# Patient Record
Sex: MALE | Race: WHITE | Employment: OTHER | URBAN - METROPOLITAN AREA
[De-identification: names, ages, dates, MRNs, and addresses within clinical notes are randomized per-mention and may not be internally consistent; named-entity substitution may affect disease eponyms.]

---

## 2017-10-31 PROBLEM — A41.9 SEPSIS (HCC): Status: ACTIVE | Noted: 2017-10-31

## 2017-10-31 PROBLEM — R31.9 HEMATURIA: Status: ACTIVE | Noted: 2017-10-31

## 2017-10-31 PROBLEM — R00.0 TACHYCARDIA: Status: ACTIVE | Noted: 2017-10-31

## 2017-10-31 PROBLEM — R10.9 ABDOMINAL PAIN: Status: ACTIVE | Noted: 2017-10-31

## 2017-10-31 PROBLEM — R50.9 FEVER: Status: ACTIVE | Noted: 2017-10-31

## 2017-10-31 PROBLEM — G93.40 ENCEPHALOPATHY: Status: ACTIVE | Noted: 2017-10-31

## 2017-10-31 PROBLEM — I10 HTN (HYPERTENSION): Status: ACTIVE | Noted: 2017-10-31

## 2017-10-31 PROBLEM — E11.9 DM (DIABETES MELLITUS) (HCC): Status: ACTIVE | Noted: 2017-10-31

## 2017-10-31 PROBLEM — H91.90 HOH (HARD OF HEARING): Status: ACTIVE | Noted: 2017-10-31

## 2017-10-31 PROBLEM — R09.02 HYPOXIA: Status: ACTIVE | Noted: 2017-10-31

## 2017-10-31 PROBLEM — I26.99 PULMONARY EMBOLISM (HCC): Status: ACTIVE | Noted: 2017-10-31

## 2017-10-31 PROBLEM — R06.02 SOB (SHORTNESS OF BREATH): Status: ACTIVE | Noted: 2017-10-31

## 2017-10-31 PROBLEM — N39.0 UTI (URINARY TRACT INFECTION): Status: ACTIVE | Noted: 2017-10-31

## 2017-10-31 PROBLEM — I95.9 HYPOTENSION: Status: ACTIVE | Noted: 2017-10-31

## 2017-11-01 PROBLEM — R10.30 LOWER ABDOMINAL PAIN: Status: ACTIVE | Noted: 2017-10-31

## 2017-11-03 PROBLEM — E44.0 MODERATE MALNUTRITION (HCC): Status: ACTIVE | Noted: 2017-11-03

## 2019-01-01 ENCOUNTER — TELEPHONE (OUTPATIENT)
Dept: CARDIOLOGY CLINIC | Age: 84
End: 2019-01-01

## 2019-01-01 ENCOUNTER — APPOINTMENT (OUTPATIENT)
Dept: INTERVENTIONAL RADIOLOGY/VASCULAR | Age: 84
DRG: 689 | End: 2019-01-01
Payer: MEDICARE

## 2019-01-01 ENCOUNTER — APPOINTMENT (OUTPATIENT)
Dept: GENERAL RADIOLOGY | Age: 84
DRG: 689 | End: 2019-01-01
Payer: MEDICARE

## 2019-01-01 ENCOUNTER — OFFICE VISIT (OUTPATIENT)
Dept: CARDIOLOGY CLINIC | Age: 84
End: 2019-01-01
Payer: MEDICARE

## 2019-01-01 ENCOUNTER — APPOINTMENT (OUTPATIENT)
Dept: GENERAL RADIOLOGY | Age: 84
DRG: 291 | End: 2019-01-01
Payer: MEDICARE

## 2019-01-01 ENCOUNTER — HOSPITAL ENCOUNTER (INPATIENT)
Age: 84
LOS: 4 days | Discharge: OTHER INSTITUTION | DRG: 291 | End: 2019-05-04
Attending: EMERGENCY MEDICINE | Admitting: INTERNAL MEDICINE
Payer: MEDICARE

## 2019-01-01 ENCOUNTER — APPOINTMENT (OUTPATIENT)
Dept: GENERAL RADIOLOGY | Age: 84
End: 2019-01-01
Payer: MEDICARE

## 2019-01-01 ENCOUNTER — APPOINTMENT (OUTPATIENT)
Dept: CT IMAGING | Age: 84
End: 2019-01-01
Payer: MEDICARE

## 2019-01-01 ENCOUNTER — HOSPITAL ENCOUNTER (OUTPATIENT)
Age: 84
Discharge: HOME | End: 2019-05-23
Payer: MEDICARE

## 2019-01-01 ENCOUNTER — HOSPITAL ENCOUNTER (INPATIENT)
Age: 84
LOS: 8 days | DRG: 689 | End: 2019-09-26
Attending: EMERGENCY MEDICINE | Admitting: EMERGENCY MEDICINE
Payer: MEDICARE

## 2019-01-01 ENCOUNTER — HOSPITAL ENCOUNTER (EMERGENCY)
Age: 84
Discharge: HOME | End: 2019-08-25
Attending: EMERGENCY MEDICINE
Payer: MEDICARE

## 2019-01-01 VITALS
HEART RATE: 73 BPM | HEIGHT: 70 IN | OXYGEN SATURATION: 95 % | SYSTOLIC BLOOD PRESSURE: 120 MMHG | DIASTOLIC BLOOD PRESSURE: 72 MMHG | WEIGHT: 157 LBS | BODY MASS INDEX: 22.48 KG/M2

## 2019-01-01 VITALS
HEART RATE: 62 BPM | BODY MASS INDEX: 21.58 KG/M2 | HEIGHT: 70 IN | RESPIRATION RATE: 16 BRPM | TEMPERATURE: 97.9 F | DIASTOLIC BLOOD PRESSURE: 74 MMHG | OXYGEN SATURATION: 92 % | SYSTOLIC BLOOD PRESSURE: 174 MMHG | WEIGHT: 150.7 LBS

## 2019-01-01 VITALS
DIASTOLIC BLOOD PRESSURE: 63 MMHG | RESPIRATION RATE: 26 BRPM | HEART RATE: 74 BPM | OXYGEN SATURATION: 94 % | WEIGHT: 146.2 LBS | TEMPERATURE: 97.6 F | SYSTOLIC BLOOD PRESSURE: 159 MMHG | BODY MASS INDEX: 20.93 KG/M2 | HEIGHT: 70 IN

## 2019-01-01 VITALS
HEIGHT: 70 IN | HEART RATE: 71 BPM | WEIGHT: 160 LBS | SYSTOLIC BLOOD PRESSURE: 193 MMHG | OXYGEN SATURATION: 93 % | DIASTOLIC BLOOD PRESSURE: 85 MMHG | RESPIRATION RATE: 18 BRPM | BODY MASS INDEX: 22.9 KG/M2 | TEMPERATURE: 97.8 F

## 2019-01-01 DIAGNOSIS — I10 ESSENTIAL HYPERTENSION: ICD-10-CM

## 2019-01-01 DIAGNOSIS — N30.00 ACUTE CYSTITIS WITHOUT HEMATURIA: Primary | ICD-10-CM

## 2019-01-01 DIAGNOSIS — I50.33 ACUTE ON CHRONIC DIASTOLIC (CONGESTIVE) HEART FAILURE (HCC): ICD-10-CM

## 2019-01-01 DIAGNOSIS — R06.02 SOB (SHORTNESS OF BREATH): ICD-10-CM

## 2019-01-01 DIAGNOSIS — E86.0 DEHYDRATION: ICD-10-CM

## 2019-01-01 DIAGNOSIS — D72.829 LEUKOCYTOSIS, UNSPECIFIED TYPE: ICD-10-CM

## 2019-01-01 DIAGNOSIS — N39.0 URINARY TRACT INFECTION WITHOUT HEMATURIA, SITE UNSPECIFIED: ICD-10-CM

## 2019-01-01 DIAGNOSIS — I50.33 ACUTE ON CHRONIC DIASTOLIC (CONGESTIVE) HEART FAILURE (HCC): Primary | ICD-10-CM

## 2019-01-01 DIAGNOSIS — I35.0 NONRHEUMATIC AORTIC VALVE STENOSIS: ICD-10-CM

## 2019-01-01 DIAGNOSIS — R41.0 DELIRIUM: ICD-10-CM

## 2019-01-01 DIAGNOSIS — I50.9 NEW ONSET OF CONGESTIVE HEART FAILURE (HCC): Primary | ICD-10-CM

## 2019-01-01 DIAGNOSIS — R42 DIZZINESS: Primary | ICD-10-CM

## 2019-01-01 DIAGNOSIS — R06.02 SHORTNESS OF BREATH: ICD-10-CM

## 2019-01-01 LAB
A/G RATIO: 1.3 (ref 1.1–2.2)
A/G RATIO: 1.4 (ref 1.1–2.2)
A/G RATIO: 1.4 (ref 1.1–2.2)
ALBUMIN SERPL-MCNC: 3.5 G/DL (ref 3.4–5)
ALBUMIN SERPL-MCNC: 3.7 G/DL (ref 3.4–5)
ALBUMIN SERPL-MCNC: 4.5 G/DL (ref 3.4–5)
ALP BLD-CCNC: 101 U/L (ref 40–129)
ALP BLD-CCNC: 111 U/L (ref 40–129)
ALP BLD-CCNC: 75 U/L (ref 40–129)
ALT SERPL-CCNC: 10 U/L (ref 10–40)
ALT SERPL-CCNC: 7 U/L (ref 10–40)
ALT SERPL-CCNC: 9 U/L (ref 10–40)
ANION GAP SERPL CALCULATED.3IONS-SCNC: 10 MMOL/L (ref 3–16)
ANION GAP SERPL CALCULATED.3IONS-SCNC: 12 MMOL/L (ref 3–16)
ANION GAP SERPL CALCULATED.3IONS-SCNC: 12 MMOL/L (ref 3–16)
ANION GAP SERPL CALCULATED.3IONS-SCNC: 13 MMOL/L (ref 3–16)
ANION GAP SERPL CALCULATED.3IONS-SCNC: 8 MMOL/L (ref 3–16)
ANION GAP SERPL CALCULATED.3IONS-SCNC: 9 MMOL/L (ref 3–16)
ANION GAP SERPL CALCULATED.3IONS-SCNC: 9 MMOL/L (ref 3–16)
ANISOCYTOSIS: ABNORMAL
AST SERPL-CCNC: 13 U/L (ref 15–37)
AST SERPL-CCNC: 14 U/L (ref 15–37)
AST SERPL-CCNC: 19 U/L (ref 15–37)
ATYPICAL LYMPHOCYTE RELATIVE PERCENT: 1 % (ref 0–6)
ATYPICAL LYMPHOCYTE RELATIVE PERCENT: 3 % (ref 0–6)
BACTERIA: ABNORMAL /HPF
BANDED NEUTROPHILS RELATIVE PERCENT: 16 % (ref 0–7)
BANDED NEUTROPHILS RELATIVE PERCENT: 17 % (ref 0–7)
BANDED NEUTROPHILS RELATIVE PERCENT: 32 % (ref 0–7)
BASE EXCESS ARTERIAL: -0.5 MMOL/L (ref -3–3)
BASE EXCESS ARTERIAL: 0.5 MMOL/L (ref -3–3)
BASE EXCESS ARTERIAL: 1.7 MMOL/L (ref -3–3)
BASE EXCESS ARTERIAL: 6.2 MMOL/L (ref -3–3)
BASE EXCESS VENOUS: 0.7 MMOL/L (ref -3–3)
BASOPHILS ABSOLUTE: 0 K/UL (ref 0–0.2)
BASOPHILS ABSOLUTE: 0.1 K/UL (ref 0–0.2)
BASOPHILS ABSOLUTE: 0.2 K/UL (ref 0–0.2)
BASOPHILS ABSOLUTE: 0.4 K/UL (ref 0–0.2)
BASOPHILS RELATIVE PERCENT: 0 %
BASOPHILS RELATIVE PERCENT: 0.4 %
BASOPHILS RELATIVE PERCENT: 0.4 %
BASOPHILS RELATIVE PERCENT: 0.5 %
BASOPHILS RELATIVE PERCENT: 0.6 %
BASOPHILS RELATIVE PERCENT: 0.7 %
BASOPHILS RELATIVE PERCENT: 0.8 %
BASOPHILS RELATIVE PERCENT: 1 %
BASOPHILS RELATIVE PERCENT: 1 %
BASOPHILS RELATIVE PERCENT: 1.3 %
BASOPHILS RELATIVE PERCENT: 2 %
BILIRUB SERPL-MCNC: 0.4 MG/DL (ref 0–1)
BILIRUB SERPL-MCNC: 0.6 MG/DL (ref 0–1)
BILIRUB SERPL-MCNC: 0.7 MG/DL (ref 0–1)
BILIRUBIN URINE: NEGATIVE
BILIRUBIN URINE: NEGATIVE
BLOOD, URINE: ABNORMAL
BLOOD, URINE: ABNORMAL
BUN BLDV-MCNC: 10 MG/DL (ref 7–20)
BUN BLDV-MCNC: 13 MG/DL (ref 7–20)
BUN BLDV-MCNC: 13 MG/DL (ref 7–20)
BUN BLDV-MCNC: 15 MG/DL (ref 7–20)
BUN BLDV-MCNC: 16 MG/DL (ref 7–20)
BUN BLDV-MCNC: 18 MG/DL (ref 7–20)
BUN BLDV-MCNC: 19 MG/DL (ref 7–20)
BUN BLDV-MCNC: 19 MG/DL (ref 7–20)
BUN BLDV-MCNC: 20 MG/DL (ref 7–20)
BUN BLDV-MCNC: 25 MG/DL (ref 7–20)
BUN BLDV-MCNC: 26 MG/DL (ref 7–20)
CALCIUM SERPL-MCNC: 10.3 MG/DL (ref 8.3–10.6)
CALCIUM SERPL-MCNC: 10.9 MG/DL (ref 8.3–10.6)
CALCIUM SERPL-MCNC: 8.6 MG/DL (ref 8.3–10.6)
CALCIUM SERPL-MCNC: 8.6 MG/DL (ref 8.3–10.6)
CALCIUM SERPL-MCNC: 8.7 MG/DL (ref 8.3–10.6)
CALCIUM SERPL-MCNC: 8.8 MG/DL (ref 8.3–10.6)
CALCIUM SERPL-MCNC: 8.8 MG/DL (ref 8.3–10.6)
CALCIUM SERPL-MCNC: 8.9 MG/DL (ref 8.3–10.6)
CALCIUM SERPL-MCNC: 9.2 MG/DL (ref 8.3–10.6)
CALCIUM SERPL-MCNC: 9.3 MG/DL (ref 8.3–10.6)
CALCIUM SERPL-MCNC: 9.7 MG/DL (ref 8.3–10.6)
CARBOXYHEMOGLOBIN ARTERIAL: 0.7 % (ref 0–1.5)
CARBOXYHEMOGLOBIN ARTERIAL: 0.8 % (ref 0–1.5)
CARBOXYHEMOGLOBIN ARTERIAL: 0.9 % (ref 0–1.5)
CARBOXYHEMOGLOBIN ARTERIAL: 1.1 % (ref 0–1.5)
CARBOXYHEMOGLOBIN: 3.1 % (ref 0–1.5)
CHLORIDE BLD-SCNC: 101 MMOL/L (ref 99–110)
CHLORIDE BLD-SCNC: 102 MMOL/L (ref 99–110)
CHLORIDE BLD-SCNC: 94 MMOL/L (ref 99–110)
CHLORIDE BLD-SCNC: 95 MMOL/L (ref 99–110)
CHLORIDE BLD-SCNC: 95 MMOL/L (ref 99–110)
CHLORIDE BLD-SCNC: 96 MMOL/L (ref 99–110)
CHLORIDE BLD-SCNC: 97 MMOL/L (ref 99–110)
CHLORIDE BLD-SCNC: 97 MMOL/L (ref 99–110)
CHLORIDE BLD-SCNC: 98 MMOL/L (ref 99–110)
CHLORIDE BLD-SCNC: 98 MMOL/L (ref 99–110)
CHLORIDE BLD-SCNC: 99 MMOL/L (ref 99–110)
CHOLESTEROL, TOTAL: 101 MG/DL (ref 0–199)
CLARITY: ABNORMAL
CLARITY: ABNORMAL
CO2: 24 MMOL/L (ref 21–32)
CO2: 28 MMOL/L (ref 21–32)
CO2: 28 MMOL/L (ref 21–32)
CO2: 30 MMOL/L (ref 21–32)
CO2: 32 MMOL/L (ref 21–32)
CO2: 32 MMOL/L (ref 21–32)
CO2: 33 MMOL/L (ref 21–32)
CO2: 35 MMOL/L (ref 21–32)
CO2: 35 MMOL/L (ref 21–32)
COLOR: YELLOW
COLOR: YELLOW
CREAT SERPL-MCNC: 0.6 MG/DL (ref 0.8–1.3)
CREAT SERPL-MCNC: 0.7 MG/DL (ref 0.8–1.3)
CREAT SERPL-MCNC: 0.8 MG/DL (ref 0.8–1.3)
CREAT SERPL-MCNC: 1.1 MG/DL (ref 0.8–1.3)
CREAT SERPL-MCNC: <0.5 MG/DL (ref 0.8–1.3)
CULTURE, RESPIRATORY: NORMAL
EKG ATRIAL RATE: 72 BPM
EKG ATRIAL RATE: 73 BPM
EKG ATRIAL RATE: 85 BPM
EKG DIAGNOSIS: NORMAL
EKG P AXIS: 55 DEGREES
EKG P AXIS: 60 DEGREES
EKG P AXIS: 67 DEGREES
EKG P-R INTERVAL: 172 MS
EKG P-R INTERVAL: 190 MS
EKG P-R INTERVAL: 202 MS
EKG Q-T INTERVAL: 368 MS
EKG Q-T INTERVAL: 394 MS
EKG Q-T INTERVAL: 404 MS
EKG QRS DURATION: 112 MS
EKG QRS DURATION: 94 MS
EKG QRS DURATION: 98 MS
EKG QTC CALCULATION (BAZETT): 431 MS
EKG QTC CALCULATION (BAZETT): 437 MS
EKG QTC CALCULATION (BAZETT): 445 MS
EKG R AXIS: -50 DEGREES
EKG R AXIS: -53 DEGREES
EKG R AXIS: -62 DEGREES
EKG T AXIS: 64 DEGREES
EKG T AXIS: 72 DEGREES
EKG T AXIS: 75 DEGREES
EKG VENTRICULAR RATE: 72 BPM
EKG VENTRICULAR RATE: 73 BPM
EKG VENTRICULAR RATE: 85 BPM
EOSINOPHILS ABSOLUTE: 0 K/UL (ref 0–0.6)
EOSINOPHILS ABSOLUTE: 0.2 K/UL (ref 0–0.6)
EOSINOPHILS ABSOLUTE: 0.2 K/UL (ref 0–0.6)
EOSINOPHILS ABSOLUTE: 0.3 K/UL (ref 0–0.6)
EOSINOPHILS ABSOLUTE: 0.4 K/UL (ref 0–0.6)
EOSINOPHILS ABSOLUTE: 0.5 K/UL (ref 0–0.6)
EOSINOPHILS ABSOLUTE: 0.7 K/UL (ref 0–0.6)
EOSINOPHILS RELATIVE PERCENT: 0 %
EOSINOPHILS RELATIVE PERCENT: 1 %
EOSINOPHILS RELATIVE PERCENT: 1.6 %
EOSINOPHILS RELATIVE PERCENT: 1.8 %
EOSINOPHILS RELATIVE PERCENT: 1.8 %
EOSINOPHILS RELATIVE PERCENT: 2 %
EOSINOPHILS RELATIVE PERCENT: 2.1 %
EOSINOPHILS RELATIVE PERCENT: 2.4 %
EOSINOPHILS RELATIVE PERCENT: 2.4 %
EOSINOPHILS RELATIVE PERCENT: 2.7 %
EOSINOPHILS RELATIVE PERCENT: 4.6 %
EPITHELIAL CELLS, UA: ABNORMAL /HPF
EPITHELIAL CELLS, UA: ABNORMAL /HPF
GFR AFRICAN AMERICAN: >60
GFR NON-AFRICAN AMERICAN: >60
GLOBULIN: 2.6 G/DL
GLOBULIN: 2.6 G/DL
GLOBULIN: 3.2 G/DL
GLUCOSE BLD-MCNC: 115 MG/DL (ref 70–99)
GLUCOSE BLD-MCNC: 118 MG/DL (ref 70–99)
GLUCOSE BLD-MCNC: 118 MG/DL (ref 70–99)
GLUCOSE BLD-MCNC: 121 MG/DL (ref 70–99)
GLUCOSE BLD-MCNC: 124 MG/DL (ref 70–99)
GLUCOSE BLD-MCNC: 124 MG/DL (ref 70–99)
GLUCOSE BLD-MCNC: 126 MG/DL (ref 70–99)
GLUCOSE BLD-MCNC: 128 MG/DL (ref 70–99)
GLUCOSE BLD-MCNC: 128 MG/DL (ref 70–99)
GLUCOSE BLD-MCNC: 131 MG/DL (ref 70–99)
GLUCOSE BLD-MCNC: 135 MG/DL (ref 70–99)
GLUCOSE BLD-MCNC: 137 MG/DL (ref 70–99)
GLUCOSE BLD-MCNC: 138 MG/DL (ref 70–99)
GLUCOSE BLD-MCNC: 142 MG/DL (ref 70–99)
GLUCOSE BLD-MCNC: 142 MG/DL (ref 70–99)
GLUCOSE BLD-MCNC: 144 MG/DL (ref 70–99)
GLUCOSE BLD-MCNC: 144 MG/DL (ref 70–99)
GLUCOSE BLD-MCNC: 145 MG/DL (ref 70–99)
GLUCOSE BLD-MCNC: 146 MG/DL (ref 70–99)
GLUCOSE BLD-MCNC: 149 MG/DL (ref 70–99)
GLUCOSE BLD-MCNC: 152 MG/DL (ref 70–99)
GLUCOSE BLD-MCNC: 153 MG/DL (ref 70–99)
GLUCOSE BLD-MCNC: 154 MG/DL (ref 70–99)
GLUCOSE BLD-MCNC: 155 MG/DL (ref 70–99)
GLUCOSE BLD-MCNC: 155 MG/DL (ref 70–99)
GLUCOSE BLD-MCNC: 156 MG/DL (ref 70–99)
GLUCOSE BLD-MCNC: 157 MG/DL (ref 70–99)
GLUCOSE BLD-MCNC: 158 MG/DL (ref 70–99)
GLUCOSE BLD-MCNC: 159 MG/DL (ref 70–99)
GLUCOSE BLD-MCNC: 160 MG/DL (ref 70–99)
GLUCOSE BLD-MCNC: 160 MG/DL (ref 70–99)
GLUCOSE BLD-MCNC: 165 MG/DL (ref 70–99)
GLUCOSE BLD-MCNC: 166 MG/DL (ref 70–99)
GLUCOSE BLD-MCNC: 166 MG/DL (ref 70–99)
GLUCOSE BLD-MCNC: 168 MG/DL (ref 70–99)
GLUCOSE BLD-MCNC: 168 MG/DL (ref 70–99)
GLUCOSE BLD-MCNC: 170 MG/DL (ref 70–99)
GLUCOSE BLD-MCNC: 170 MG/DL (ref 70–99)
GLUCOSE BLD-MCNC: 171 MG/DL (ref 70–99)
GLUCOSE BLD-MCNC: 173 MG/DL (ref 70–99)
GLUCOSE BLD-MCNC: 179 MG/DL (ref 70–99)
GLUCOSE BLD-MCNC: 180 MG/DL (ref 70–99)
GLUCOSE BLD-MCNC: 182 MG/DL (ref 70–99)
GLUCOSE BLD-MCNC: 182 MG/DL (ref 70–99)
GLUCOSE BLD-MCNC: 184 MG/DL (ref 70–99)
GLUCOSE BLD-MCNC: 187 MG/DL (ref 70–99)
GLUCOSE BLD-MCNC: 187 MG/DL (ref 70–99)
GLUCOSE BLD-MCNC: 191 MG/DL (ref 70–99)
GLUCOSE BLD-MCNC: 191 MG/DL (ref 70–99)
GLUCOSE BLD-MCNC: 196 MG/DL (ref 70–99)
GLUCOSE BLD-MCNC: 208 MG/DL (ref 70–99)
GLUCOSE BLD-MCNC: 210 MG/DL (ref 70–99)
GLUCOSE BLD-MCNC: 215 MG/DL (ref 70–99)
GLUCOSE BLD-MCNC: 258 MG/DL (ref 70–99)
GLUCOSE BLD-MCNC: 63 MG/DL (ref 70–99)
GLUCOSE URINE: NEGATIVE MG/DL
GLUCOSE URINE: NEGATIVE MG/DL
GRAM STAIN RESULT: NORMAL
HCO3 ARTERIAL: 26.8 MMOL/L (ref 21–29)
HCO3 ARTERIAL: 28.5 MMOL/L (ref 21–29)
HCO3 ARTERIAL: 29.5 MMOL/L (ref 21–29)
HCO3 ARTERIAL: 33.7 MMOL/L (ref 21–29)
HCO3 VENOUS: 26.4 MMOL/L (ref 23–29)
HCT VFR BLD CALC: 50 % (ref 40.5–52.5)
HCT VFR BLD CALC: 50.5 % (ref 40.5–52.5)
HCT VFR BLD CALC: 51.1 % (ref 40.5–52.5)
HCT VFR BLD CALC: 51.3 % (ref 40.5–52.5)
HCT VFR BLD CALC: 51.5 % (ref 40.5–52.5)
HCT VFR BLD CALC: 52 % (ref 40.5–52.5)
HCT VFR BLD CALC: 52.1 % (ref 40.5–52.5)
HCT VFR BLD CALC: 52.5 % (ref 40.5–52.5)
HCT VFR BLD CALC: 52.8 % (ref 40.5–52.5)
HCT VFR BLD CALC: 52.8 % (ref 40.5–52.5)
HCT VFR BLD CALC: 55.5 % (ref 40.5–52.5)
HDLC SERPL-MCNC: 42 MG/DL (ref 40–60)
HEMOGLOBIN, ART, EXTENDED: 17.1 G/DL (ref 13.5–17.5)
HEMOGLOBIN, ART, EXTENDED: 17.5 G/DL (ref 13.5–17.5)
HEMOGLOBIN, ART, EXTENDED: 17.7 G/DL (ref 13.5–17.5)
HEMOGLOBIN, ART, EXTENDED: 17.9 G/DL (ref 13.5–17.5)
HEMOGLOBIN: 16 G/DL (ref 13.5–17.5)
HEMOGLOBIN: 16.3 G/DL (ref 13.5–17.5)
HEMOGLOBIN: 16.4 G/DL (ref 13.5–17.5)
HEMOGLOBIN: 16.6 G/DL (ref 13.5–17.5)
HEMOGLOBIN: 16.7 G/DL (ref 13.5–17.5)
HEMOGLOBIN: 16.8 G/DL (ref 13.5–17.5)
HEMOGLOBIN: 17 G/DL (ref 13.5–17.5)
HEMOGLOBIN: 18.1 G/DL (ref 13.5–17.5)
KETONES, URINE: NEGATIVE MG/DL
KETONES, URINE: NEGATIVE MG/DL
LACTIC ACID: 0.9 MMOL/L (ref 0.4–2)
LACTIC ACID: 1 MMOL/L (ref 0.4–2)
LDL CHOLESTEROL CALCULATED: 40 MG/DL
LEUKOCYTE ESTERASE, URINE: ABNORMAL
LEUKOCYTE ESTERASE, URINE: ABNORMAL
LIPASE: 24 U/L (ref 13–60)
LV EF: 55 %
LVEF MODALITY: NORMAL
LYMPHOCYTES ABSOLUTE: 1 K/UL (ref 1–5.1)
LYMPHOCYTES ABSOLUTE: 1.1 K/UL (ref 1–5.1)
LYMPHOCYTES ABSOLUTE: 1.2 K/UL (ref 1–5.1)
LYMPHOCYTES ABSOLUTE: 1.3 K/UL (ref 1–5.1)
LYMPHOCYTES ABSOLUTE: 1.4 K/UL (ref 1–5.1)
LYMPHOCYTES ABSOLUTE: 1.6 K/UL (ref 1–5.1)
LYMPHOCYTES ABSOLUTE: 1.7 K/UL (ref 1–5.1)
LYMPHOCYTES ABSOLUTE: 1.9 K/UL (ref 1–5.1)
LYMPHOCYTES RELATIVE PERCENT: 11.2 %
LYMPHOCYTES RELATIVE PERCENT: 4 %
LYMPHOCYTES RELATIVE PERCENT: 5.8 %
LYMPHOCYTES RELATIVE PERCENT: 6.5 %
LYMPHOCYTES RELATIVE PERCENT: 7 %
LYMPHOCYTES RELATIVE PERCENT: 7 %
LYMPHOCYTES RELATIVE PERCENT: 7.1 %
LYMPHOCYTES RELATIVE PERCENT: 8 %
LYMPHOCYTES RELATIVE PERCENT: 8.3 %
LYMPHOCYTES RELATIVE PERCENT: 8.7 %
LYMPHOCYTES RELATIVE PERCENT: 9.4 %
MAGNESIUM: 1.5 MG/DL (ref 1.8–2.4)
MAGNESIUM: 1.6 MG/DL (ref 1.8–2.4)
MAGNESIUM: 1.7 MG/DL (ref 1.8–2.4)
MAGNESIUM: 1.9 MG/DL (ref 1.8–2.4)
MAGNESIUM: 2 MG/DL (ref 1.8–2.4)
MCH RBC QN AUTO: 25.1 PG (ref 26–34)
MCH RBC QN AUTO: 26.8 PG (ref 26–34)
MCH RBC QN AUTO: 26.9 PG (ref 26–34)
MCH RBC QN AUTO: 27 PG (ref 26–34)
MCH RBC QN AUTO: 27.1 PG (ref 26–34)
MCH RBC QN AUTO: 27.2 PG (ref 26–34)
MCH RBC QN AUTO: 27.2 PG (ref 26–34)
MCH RBC QN AUTO: 27.3 PG (ref 26–34)
MCHC RBC AUTO-ENTMCNC: 31.7 G/DL (ref 31–36)
MCHC RBC AUTO-ENTMCNC: 31.7 G/DL (ref 31–36)
MCHC RBC AUTO-ENTMCNC: 31.8 G/DL (ref 31–36)
MCHC RBC AUTO-ENTMCNC: 32 G/DL (ref 31–36)
MCHC RBC AUTO-ENTMCNC: 32.1 G/DL (ref 31–36)
MCHC RBC AUTO-ENTMCNC: 32.2 G/DL (ref 31–36)
MCHC RBC AUTO-ENTMCNC: 32.3 G/DL (ref 31–36)
MCHC RBC AUTO-ENTMCNC: 32.4 G/DL (ref 31–36)
MCHC RBC AUTO-ENTMCNC: 32.4 G/DL (ref 31–36)
MCHC RBC AUTO-ENTMCNC: 32.6 G/DL (ref 31–36)
MCHC RBC AUTO-ENTMCNC: 32.7 G/DL (ref 31–36)
MCV RBC AUTO: 78.3 FL (ref 80–100)
MCV RBC AUTO: 83.3 FL (ref 80–100)
MCV RBC AUTO: 83.5 FL (ref 80–100)
MCV RBC AUTO: 83.5 FL (ref 80–100)
MCV RBC AUTO: 83.7 FL (ref 80–100)
MCV RBC AUTO: 83.7 FL (ref 80–100)
MCV RBC AUTO: 83.8 FL (ref 80–100)
MCV RBC AUTO: 84.2 FL (ref 80–100)
MCV RBC AUTO: 84.8 FL (ref 80–100)
MCV RBC AUTO: 85.2 FL (ref 80–100)
MCV RBC AUTO: 85.2 FL (ref 80–100)
METHEMOGLOBIN ARTERIAL: 0.6 %
METHEMOGLOBIN ARTERIAL: 0.7 %
METHEMOGLOBIN ARTERIAL: 0.8 %
METHEMOGLOBIN ARTERIAL: 0.8 %
METHEMOGLOBIN VENOUS: 0.7 %
MICROSCOPIC EXAMINATION: YES
MICROSCOPIC EXAMINATION: YES
MONOCYTES ABSOLUTE: 0.2 K/UL (ref 0–1.3)
MONOCYTES ABSOLUTE: 0.3 K/UL (ref 0–1.3)
MONOCYTES ABSOLUTE: 0.5 K/UL (ref 0–1.3)
MONOCYTES ABSOLUTE: 0.6 K/UL (ref 0–1.3)
MONOCYTES ABSOLUTE: 0.7 K/UL (ref 0–1.3)
MONOCYTES RELATIVE PERCENT: 1 %
MONOCYTES RELATIVE PERCENT: 2 %
MONOCYTES RELATIVE PERCENT: 3 %
MONOCYTES RELATIVE PERCENT: 3.1 %
MONOCYTES RELATIVE PERCENT: 3.2 %
MONOCYTES RELATIVE PERCENT: 3.2 %
MONOCYTES RELATIVE PERCENT: 3.3 %
MONOCYTES RELATIVE PERCENT: 3.4 %
MONOCYTES RELATIVE PERCENT: 3.4 %
MONOCYTES RELATIVE PERCENT: 3.6 %
MONOCYTES RELATIVE PERCENT: 4 %
MYELOCYTE PERCENT: 1 %
NEUTROPHILS ABSOLUTE: 11.6 K/UL (ref 1.7–7.7)
NEUTROPHILS ABSOLUTE: 13.4 K/UL (ref 1.7–7.7)
NEUTROPHILS ABSOLUTE: 13.4 K/UL (ref 1.7–7.7)
NEUTROPHILS ABSOLUTE: 13.8 K/UL (ref 1.7–7.7)
NEUTROPHILS ABSOLUTE: 14 K/UL (ref 1.7–7.7)
NEUTROPHILS ABSOLUTE: 14 K/UL (ref 1.7–7.7)
NEUTROPHILS ABSOLUTE: 16.7 K/UL (ref 1.7–7.7)
NEUTROPHILS ABSOLUTE: 17.7 K/UL (ref 1.7–7.7)
NEUTROPHILS ABSOLUTE: 17.9 K/UL (ref 1.7–7.7)
NEUTROPHILS ABSOLUTE: 18.1 K/UL (ref 1.7–7.7)
NEUTROPHILS ABSOLUTE: 9.4 K/UL (ref 1.7–7.7)
NEUTROPHILS RELATIVE PERCENT: 59 %
NEUTROPHILS RELATIVE PERCENT: 68 %
NEUTROPHILS RELATIVE PERCENT: 72 %
NEUTROPHILS RELATIVE PERCENT: 79.5 %
NEUTROPHILS RELATIVE PERCENT: 84.2 %
NEUTROPHILS RELATIVE PERCENT: 84.9 %
NEUTROPHILS RELATIVE PERCENT: 85.2 %
NEUTROPHILS RELATIVE PERCENT: 86.3 %
NEUTROPHILS RELATIVE PERCENT: 87.1 %
NEUTROPHILS RELATIVE PERCENT: 87.7 %
NEUTROPHILS RELATIVE PERCENT: 88.8 %
NITRITE, URINE: NEGATIVE
NITRITE, URINE: NEGATIVE
O2 CONTENT ARTERIAL: 22 ML/DL
O2 CONTENT ARTERIAL: 23 ML/DL
O2 CONTENT ARTERIAL: 23 ML/DL
O2 CONTENT ARTERIAL: 24 ML/DL
O2 CONTENT, VEN: 22 VOL %
O2 SAT, ARTERIAL: 92.7 %
O2 SAT, ARTERIAL: 93 %
O2 SAT, ARTERIAL: 96.1 %
O2 SAT, ARTERIAL: 96.5 %
O2 SAT, VEN: 96 %
O2 THERAPY: ABNORMAL
PCO2 ARTERIAL: 51.9 MMHG (ref 35–45)
PCO2 ARTERIAL: 53.2 MMHG (ref 35–45)
PCO2 ARTERIAL: 58.3 MMHG (ref 35–45)
PCO2 ARTERIAL: 64.6 MMHG (ref 35–45)
PCO2, VEN: 45.9 MMHG (ref 40–50)
PDW BLD-RTO: 16.2 % (ref 12.4–15.4)
PDW BLD-RTO: 16.4 % (ref 12.4–15.4)
PDW BLD-RTO: 16.4 % (ref 12.4–15.4)
PDW BLD-RTO: 16.5 % (ref 12.4–15.4)
PDW BLD-RTO: 16.6 % (ref 12.4–15.4)
PDW BLD-RTO: 16.7 % (ref 12.4–15.4)
PDW BLD-RTO: 16.8 % (ref 12.4–15.4)
PDW BLD-RTO: 17.6 % (ref 12.4–15.4)
PDW BLD-RTO: 18.1 % (ref 12.4–15.4)
PERFORMED ON: ABNORMAL
PH ARTERIAL: 7.28 (ref 7.35–7.45)
PH ARTERIAL: 7.32 (ref 7.35–7.45)
PH ARTERIAL: 7.36 (ref 7.35–7.45)
PH ARTERIAL: 7.38 (ref 7.35–7.45)
PH UA: 6 (ref 5–8)
PH UA: 6.5 (ref 5–8)
PH VENOUS: 7.38 (ref 7.35–7.45)
PLATELET # BLD: 184 K/UL (ref 135–450)
PLATELET # BLD: 212 K/UL (ref 135–450)
PLATELET # BLD: 226 K/UL (ref 135–450)
PLATELET # BLD: 235 K/UL (ref 135–450)
PLATELET # BLD: 246 K/UL (ref 135–450)
PLATELET # BLD: 248 K/UL (ref 135–450)
PLATELET # BLD: 253 K/UL (ref 135–450)
PLATELET # BLD: 256 K/UL (ref 135–450)
PLATELET # BLD: 258 K/UL (ref 135–450)
PLATELET # BLD: 269 K/UL (ref 135–450)
PLATELET # BLD: 291 K/UL (ref 135–450)
PLATELET SLIDE REVIEW: ADEQUATE
PLATELET SLIDE REVIEW: ADEQUATE
PMV BLD AUTO: 7.4 FL (ref 5–10.5)
PMV BLD AUTO: 7.6 FL (ref 5–10.5)
PMV BLD AUTO: 7.7 FL (ref 5–10.5)
PMV BLD AUTO: 7.7 FL (ref 5–10.5)
PMV BLD AUTO: 7.8 FL (ref 5–10.5)
PMV BLD AUTO: 7.9 FL (ref 5–10.5)
PMV BLD AUTO: 8 FL (ref 5–10.5)
PMV BLD AUTO: 8.1 FL (ref 5–10.5)
PO2 ARTERIAL: 62.6 MMHG (ref 75–108)
PO2 ARTERIAL: 67 MMHG (ref 75–108)
PO2 ARTERIAL: 80.6 MMHG (ref 75–108)
PO2 ARTERIAL: 84.7 MMHG (ref 75–108)
PO2, VEN: 81.7 MMHG (ref 25–40)
POTASSIUM REFLEX MAGNESIUM: 3.6 MMOL/L (ref 3.5–5.1)
POTASSIUM REFLEX MAGNESIUM: 4.1 MMOL/L (ref 3.5–5.1)
POTASSIUM REFLEX MAGNESIUM: 4.3 MMOL/L (ref 3.5–5.1)
POTASSIUM SERPL-SCNC: 3.4 MMOL/L (ref 3.5–5.1)
POTASSIUM SERPL-SCNC: 3.4 MMOL/L (ref 3.5–5.1)
POTASSIUM SERPL-SCNC: 3.7 MMOL/L (ref 3.5–5.1)
POTASSIUM SERPL-SCNC: 3.9 MMOL/L (ref 3.5–5.1)
POTASSIUM SERPL-SCNC: 4.1 MMOL/L (ref 3.5–5.1)
POTASSIUM SERPL-SCNC: 4.4 MMOL/L (ref 3.5–5.1)
POTASSIUM SERPL-SCNC: 5.3 MMOL/L (ref 3.5–5.1)
POTASSIUM SERPL-SCNC: 5.4 MMOL/L (ref 3.5–5.1)
PRO-BNP: 1500 PG/ML (ref 0–449)
PRO-BNP: 1554 PG/ML (ref 0–449)
PRO-BNP: 3026 PG/ML (ref 0–449)
PRO-BNP: 661 PG/ML (ref 0–449)
PROCALCITONIN: 0.06 NG/ML (ref 0–0.15)
PROTEIN UA: 30 MG/DL
PROTEIN UA: NEGATIVE MG/DL
RBC # BLD: 6.07 M/UL (ref 4.2–5.9)
RBC # BLD: 6.1 M/UL (ref 4.2–5.9)
RBC # BLD: 6.14 M/UL (ref 4.2–5.9)
RBC # BLD: 6.14 M/UL (ref 4.2–5.9)
RBC # BLD: 6.15 M/UL (ref 4.2–5.9)
RBC # BLD: 6.17 M/UL (ref 4.2–5.9)
RBC # BLD: 6.2 M/UL (ref 4.2–5.9)
RBC # BLD: 6.2 M/UL (ref 4.2–5.9)
RBC # BLD: 6.28 M/UL (ref 4.2–5.9)
RBC # BLD: 6.39 M/UL (ref 4.2–5.9)
RBC # BLD: 6.65 M/UL (ref 4.2–5.9)
RBC UA: ABNORMAL /HPF (ref 0–2)
RBC UA: ABNORMAL /HPF (ref 0–2)
SLIDE REVIEW: ABNORMAL
SODIUM BLD-SCNC: 134 MMOL/L (ref 136–145)
SODIUM BLD-SCNC: 137 MMOL/L (ref 136–145)
SODIUM BLD-SCNC: 137 MMOL/L (ref 136–145)
SODIUM BLD-SCNC: 138 MMOL/L (ref 136–145)
SODIUM BLD-SCNC: 139 MMOL/L (ref 136–145)
SODIUM BLD-SCNC: 139 MMOL/L (ref 136–145)
SODIUM BLD-SCNC: 140 MMOL/L (ref 136–145)
SODIUM BLD-SCNC: 141 MMOL/L (ref 136–145)
SODIUM BLD-SCNC: 143 MMOL/L (ref 136–145)
SPECIFIC GRAVITY UA: 1.01 (ref 1–1.03)
SPECIFIC GRAVITY UA: 1.01 (ref 1–1.03)
TCO2 ARTERIAL: 28.4 MMOL/L
TCO2 ARTERIAL: 30.1 MMOL/L
TCO2 ARTERIAL: 31.5 MMOL/L
TCO2 ARTERIAL: 35.5 MMOL/L
TCO2 CALC VENOUS: 28 MMOL/L
TOTAL PROTEIN: 6.1 G/DL (ref 6.4–8.2)
TOTAL PROTEIN: 6.3 G/DL (ref 6.4–8.2)
TOTAL PROTEIN: 7.7 G/DL (ref 6.4–8.2)
TRIGL SERPL-MCNC: 93 MG/DL (ref 0–150)
TROPONIN: <0.01 NG/ML
URINE CULTURE, ROUTINE: NORMAL
URINE CULTURE, ROUTINE: NORMAL
URINE REFLEX TO CULTURE: YES
URINE TYPE: ABNORMAL
URINE TYPE: ABNORMAL
UROBILINOGEN, URINE: 0.2 E.U./DL
UROBILINOGEN, URINE: 0.2 E.U./DL
VLDLC SERPL CALC-MCNC: 19 MG/DL
WBC # BLD: 11.2 K/UL (ref 4–11)
WBC # BLD: 14.6 K/UL (ref 4–11)
WBC # BLD: 15 K/UL (ref 4–11)
WBC # BLD: 15.7 K/UL (ref 4–11)
WBC # BLD: 16 K/UL (ref 4–11)
WBC # BLD: 16 K/UL (ref 4–11)
WBC # BLD: 16.1 K/UL (ref 4–11)
WBC # BLD: 19.6 K/UL (ref 4–11)
WBC # BLD: 19.9 K/UL (ref 4–11)
WBC # BLD: 20 K/UL (ref 4–11)
WBC # BLD: 21 K/UL (ref 4–11)
WBC UA: >100 /HPF (ref 0–5)
WBC UA: ABNORMAL /HPF (ref 0–5)

## 2019-01-01 PROCEDURE — 6370000000 HC RX 637 (ALT 250 FOR IP): Performed by: NURSE PRACTITIONER

## 2019-01-01 PROCEDURE — 83880 ASSAY OF NATRIURETIC PEPTIDE: CPT

## 2019-01-01 PROCEDURE — 94640 AIRWAY INHALATION TREATMENT: CPT

## 2019-01-01 PROCEDURE — 36415 COLL VENOUS BLD VENIPUNCTURE: CPT

## 2019-01-01 PROCEDURE — 1200000000 HC SEMI PRIVATE

## 2019-01-01 PROCEDURE — 97530 THERAPEUTIC ACTIVITIES: CPT

## 2019-01-01 PROCEDURE — 71045 X-RAY EXAM CHEST 1 VIEW: CPT

## 2019-01-01 PROCEDURE — 83735 ASSAY OF MAGNESIUM: CPT

## 2019-01-01 PROCEDURE — 6360000002 HC RX W HCPCS: Performed by: INTERNAL MEDICINE

## 2019-01-01 PROCEDURE — 94150 VITAL CAPACITY TEST: CPT

## 2019-01-01 PROCEDURE — 2580000003 HC RX 258: Performed by: INTERNAL MEDICINE

## 2019-01-01 PROCEDURE — 2700000000 HC OXYGEN THERAPY PER DAY

## 2019-01-01 PROCEDURE — 93010 ELECTROCARDIOGRAM REPORT: CPT | Performed by: INTERNAL MEDICINE

## 2019-01-01 PROCEDURE — C1751 CATH, INF, PER/CENT/MIDLINE: HCPCS

## 2019-01-01 PROCEDURE — 99285 EMERGENCY DEPT VISIT HI MDM: CPT

## 2019-01-01 PROCEDURE — 6370000000 HC RX 637 (ALT 250 FOR IP): Performed by: INTERNAL MEDICINE

## 2019-01-01 PROCEDURE — 31720 CLEARANCE OF AIRWAYS: CPT

## 2019-01-01 PROCEDURE — 36600 WITHDRAWAL OF ARTERIAL BLOOD: CPT

## 2019-01-01 PROCEDURE — 93306 TTE W/DOPPLER COMPLETE: CPT

## 2019-01-01 PROCEDURE — 2500000003 HC RX 250 WO HCPCS: Performed by: INTERNAL MEDICINE

## 2019-01-01 PROCEDURE — 6360000002 HC RX W HCPCS: Performed by: EMERGENCY MEDICINE

## 2019-01-01 PROCEDURE — 97110 THERAPEUTIC EXERCISES: CPT

## 2019-01-01 PROCEDURE — 51702 INSERT TEMP BLADDER CATH: CPT

## 2019-01-01 PROCEDURE — 94660 CPAP INITIATION&MGMT: CPT

## 2019-01-01 PROCEDURE — 87040 BLOOD CULTURE FOR BACTERIA: CPT

## 2019-01-01 PROCEDURE — 94761 N-INVAS EAR/PLS OXIMETRY MLT: CPT

## 2019-01-01 PROCEDURE — 87206 SMEAR FLUORESCENT/ACID STAI: CPT

## 2019-01-01 PROCEDURE — 83605 ASSAY OF LACTIC ACID: CPT

## 2019-01-01 PROCEDURE — 99233 SBSQ HOSP IP/OBS HIGH 50: CPT | Performed by: NURSE PRACTITIONER

## 2019-01-01 PROCEDURE — 93005 ELECTROCARDIOGRAM TRACING: CPT | Performed by: EMERGENCY MEDICINE

## 2019-01-01 PROCEDURE — 84145 PROCALCITONIN (PCT): CPT

## 2019-01-01 PROCEDURE — 2060000000 HC ICU INTERMEDIATE R&B

## 2019-01-01 PROCEDURE — 2580000003 HC RX 258: Performed by: NURSE PRACTITIONER

## 2019-01-01 PROCEDURE — 6360000002 HC RX W HCPCS: Performed by: NURSE PRACTITIONER

## 2019-01-01 PROCEDURE — 85025 COMPLETE CBC W/AUTO DIFF WBC: CPT

## 2019-01-01 PROCEDURE — 94668 MNPJ CHEST WALL SBSQ: CPT

## 2019-01-01 PROCEDURE — 97116 GAIT TRAINING THERAPY: CPT

## 2019-01-01 PROCEDURE — 99233 SBSQ HOSP IP/OBS HIGH 50: CPT | Performed by: INTERNAL MEDICINE

## 2019-01-01 PROCEDURE — 2580000003 HC RX 258: Performed by: EMERGENCY MEDICINE

## 2019-01-01 PROCEDURE — 80048 BASIC METABOLIC PNL TOTAL CA: CPT

## 2019-01-01 PROCEDURE — 0B9F8ZX DRAINAGE OF RIGHT LOWER LUNG LOBE, VIA NATURAL OR ARTIFICIAL OPENING ENDOSCOPIC, DIAGNOSTIC: ICD-10-PCS | Performed by: INTERNAL MEDICINE

## 2019-01-01 PROCEDURE — 84484 ASSAY OF TROPONIN QUANT: CPT

## 2019-01-01 PROCEDURE — 94667 MNPJ CHEST WALL 1ST: CPT

## 2019-01-01 PROCEDURE — 3609010900 HC BRONCHOSCOPY THERAPUTIC ASPIRATION INITIAL: Performed by: INTERNAL MEDICINE

## 2019-01-01 PROCEDURE — 2709999900 HC NON-CHARGEABLE SUPPLY: Performed by: INTERNAL MEDICINE

## 2019-01-01 PROCEDURE — 02HV33Z INSERTION OF INFUSION DEVICE INTO SUPERIOR VENA CAVA, PERCUTANEOUS APPROACH: ICD-10-PCS | Performed by: RADIOLOGY

## 2019-01-01 PROCEDURE — 3609010800 HC BRONCHOSCOPY ALVEOLAR LAVAGE: Performed by: INTERNAL MEDICINE

## 2019-01-01 PROCEDURE — 87086 URINE CULTURE/COLONY COUNT: CPT

## 2019-01-01 PROCEDURE — 99222 1ST HOSP IP/OBS MODERATE 55: CPT | Performed by: INTERNAL MEDICINE

## 2019-01-01 PROCEDURE — 88112 CYTOPATH CELL ENHANCE TECH: CPT

## 2019-01-01 PROCEDURE — 99232 SBSQ HOSP IP/OBS MODERATE 35: CPT | Performed by: INTERNAL MEDICINE

## 2019-01-01 PROCEDURE — 81001 URINALYSIS AUTO W/SCOPE: CPT

## 2019-01-01 PROCEDURE — 82803 BLOOD GASES ANY COMBINATION: CPT

## 2019-01-01 PROCEDURE — 96365 THER/PROPH/DIAG IV INF INIT: CPT

## 2019-01-01 PROCEDURE — 97166 OT EVAL MOD COMPLEX 45 MIN: CPT

## 2019-01-01 PROCEDURE — 87015 SPECIMEN INFECT AGNT CONCNTJ: CPT

## 2019-01-01 PROCEDURE — 51798 US URINE CAPACITY MEASURE: CPT

## 2019-01-01 PROCEDURE — 99214 OFFICE O/P EST MOD 30 MIN: CPT | Performed by: NURSE PRACTITIONER

## 2019-01-01 PROCEDURE — 99223 1ST HOSP IP/OBS HIGH 75: CPT | Performed by: INTERNAL MEDICINE

## 2019-01-01 PROCEDURE — 87116 MYCOBACTERIA CULTURE: CPT

## 2019-01-01 PROCEDURE — 71046 X-RAY EXAM CHEST 2 VIEWS: CPT

## 2019-01-01 PROCEDURE — 36573 INSJ PICC RS&I 5 YR+: CPT

## 2019-01-01 PROCEDURE — 97161 PT EVAL LOW COMPLEX 20 MIN: CPT

## 2019-01-01 PROCEDURE — 83690 ASSAY OF LIPASE: CPT

## 2019-01-01 PROCEDURE — 6370000000 HC RX 637 (ALT 250 FOR IP): Performed by: EMERGENCY MEDICINE

## 2019-01-01 PROCEDURE — 2580000003 HC RX 258

## 2019-01-01 PROCEDURE — 2500000003 HC RX 250 WO HCPCS: Performed by: NURSE PRACTITIONER

## 2019-01-01 PROCEDURE — 80053 COMPREHEN METABOLIC PANEL: CPT

## 2019-01-01 PROCEDURE — 80061 LIPID PANEL: CPT

## 2019-01-01 PROCEDURE — 88305 TISSUE EXAM BY PATHOLOGIST: CPT

## 2019-01-01 PROCEDURE — 70450 CT HEAD/BRAIN W/O DYE: CPT

## 2019-01-01 PROCEDURE — 99291 CRITICAL CARE FIRST HOUR: CPT | Performed by: INTERNAL MEDICINE

## 2019-01-01 PROCEDURE — 87102 FUNGUS ISOLATION CULTURE: CPT

## 2019-01-01 PROCEDURE — 7100000010 HC PHASE II RECOVERY - FIRST 15 MIN: Performed by: INTERNAL MEDICINE

## 2019-01-01 PROCEDURE — 87205 SMEAR GRAM STAIN: CPT

## 2019-01-01 PROCEDURE — 7100000011 HC PHASE II RECOVERY - ADDTL 15 MIN: Performed by: INTERNAL MEDICINE

## 2019-01-01 PROCEDURE — 31645 BRNCHSC W/THER ASPIR 1ST: CPT | Performed by: INTERNAL MEDICINE

## 2019-01-01 PROCEDURE — 99231 SBSQ HOSP IP/OBS SF/LOW 25: CPT | Performed by: INTERNAL MEDICINE

## 2019-01-01 PROCEDURE — 96374 THER/PROPH/DIAG INJ IV PUSH: CPT

## 2019-01-01 PROCEDURE — 87106 FUNGI IDENTIFICATION YEAST: CPT

## 2019-01-01 PROCEDURE — 31624 DX BRONCHOSCOPE/LAVAGE: CPT | Performed by: INTERNAL MEDICINE

## 2019-01-01 PROCEDURE — 51701 INSERT BLADDER CATHETER: CPT

## 2019-01-01 PROCEDURE — 88312 SPECIAL STAINS GROUP 1: CPT

## 2019-01-01 PROCEDURE — 87070 CULTURE OTHR SPECIMN AEROBIC: CPT

## 2019-01-01 PROCEDURE — 97162 PT EVAL MOD COMPLEX 30 MIN: CPT

## 2019-01-01 RX ORDER — ZIPRASIDONE MESYLATE 20 MG/ML
20 INJECTION, POWDER, LYOPHILIZED, FOR SOLUTION INTRAMUSCULAR ONCE
Status: COMPLETED | OUTPATIENT
Start: 2019-01-01 | End: 2019-01-01

## 2019-01-01 RX ORDER — 0.9 % SODIUM CHLORIDE 0.9 %
500 INTRAVENOUS SOLUTION INTRAVENOUS ONCE
Status: COMPLETED | OUTPATIENT
Start: 2019-01-01 | End: 2019-01-01

## 2019-01-01 RX ORDER — SODIUM CHLORIDE 0.9 % (FLUSH) 0.9 %
10 SYRINGE (ML) INJECTION EVERY 12 HOURS SCHEDULED
Status: DISCONTINUED | OUTPATIENT
Start: 2019-01-01 | End: 2019-01-01 | Stop reason: HOSPADM

## 2019-01-01 RX ORDER — NICOTINE POLACRILEX 4 MG
15 LOZENGE BUCCAL PRN
Status: DISCONTINUED | OUTPATIENT
Start: 2019-01-01 | End: 2019-01-01 | Stop reason: HOSPADM

## 2019-01-01 RX ORDER — DEXTROSE MONOHYDRATE 50 MG/ML
100 INJECTION, SOLUTION INTRAVENOUS PRN
Status: DISCONTINUED | OUTPATIENT
Start: 2019-01-01 | End: 2019-01-01 | Stop reason: HOSPADM

## 2019-01-01 RX ORDER — LIDOCAINE HYDROCHLORIDE 10 MG/ML
5 INJECTION, SOLUTION INFILTRATION; PERINEURAL ONCE
Status: COMPLETED | OUTPATIENT
Start: 2019-01-01 | End: 2019-01-01

## 2019-01-01 RX ORDER — ONDANSETRON 2 MG/ML
4 INJECTION INTRAMUSCULAR; INTRAVENOUS EVERY 6 HOURS PRN
Status: DISCONTINUED | OUTPATIENT
Start: 2019-01-01 | End: 2019-01-01

## 2019-01-01 RX ORDER — MECLIZINE HCL 12.5 MG/1
25 TABLET ORAL ONCE
Status: COMPLETED | OUTPATIENT
Start: 2019-01-01 | End: 2019-01-01

## 2019-01-01 RX ORDER — CEFUROXIME AXETIL 250 MG/1
250 TABLET ORAL 2 TIMES DAILY
Qty: 20 TABLET | Refills: 0 | Status: SHIPPED | OUTPATIENT
Start: 2019-01-01 | End: 2019-01-01

## 2019-01-01 RX ORDER — SODIUM CHLORIDE 9 MG/ML
INJECTION, SOLUTION INTRAVENOUS CONTINUOUS
Status: DISCONTINUED | OUTPATIENT
Start: 2019-01-01 | End: 2019-01-01

## 2019-01-01 RX ORDER — ATROPINE SULFATE 10 MG/ML
1 SOLUTION/ DROPS OPHTHALMIC
Status: DISCONTINUED | OUTPATIENT
Start: 2019-01-01 | End: 2019-01-01 | Stop reason: HOSPADM

## 2019-01-01 RX ORDER — DEXTROSE MONOHYDRATE 25 G/50ML
12.5 INJECTION, SOLUTION INTRAVENOUS PRN
Status: DISCONTINUED | OUTPATIENT
Start: 2019-01-01 | End: 2019-01-01

## 2019-01-01 RX ORDER — FUROSEMIDE 10 MG/ML
40 INJECTION INTRAMUSCULAR; INTRAVENOUS ONCE
Status: COMPLETED | OUTPATIENT
Start: 2019-01-01 | End: 2019-01-01

## 2019-01-01 RX ORDER — LIDOCAINE HYDROCHLORIDE 20 MG/ML
INJECTION, SOLUTION INFILTRATION; PERINEURAL PRN
Status: DISCONTINUED | OUTPATIENT
Start: 2019-01-01 | End: 2019-01-01 | Stop reason: ALTCHOICE

## 2019-01-01 RX ORDER — DOCUSATE SODIUM 100 MG/1
100 CAPSULE, LIQUID FILLED ORAL DAILY
COMMUNITY

## 2019-01-01 RX ORDER — CHOLECALCIFEROL (VITAMIN D3) 125 MCG
2000 CAPSULE ORAL DAILY
Status: DISCONTINUED | OUTPATIENT
Start: 2019-01-01 | End: 2019-01-01

## 2019-01-01 RX ORDER — ACETAMINOPHEN 325 MG/1
TABLET ORAL
Status: DISPENSED
Start: 2019-01-01 | End: 2019-01-01

## 2019-01-01 RX ORDER — FINASTERIDE 5 MG/1
5 TABLET, FILM COATED ORAL DAILY
Status: DISCONTINUED | OUTPATIENT
Start: 2019-01-01 | End: 2019-01-01 | Stop reason: HOSPADM

## 2019-01-01 RX ORDER — SODIUM CHLORIDE 0.9 % (FLUSH) 0.9 %
10 SYRINGE (ML) INJECTION PRN
Status: DISCONTINUED | OUTPATIENT
Start: 2019-01-01 | End: 2019-01-01 | Stop reason: HOSPADM

## 2019-01-01 RX ORDER — SODIUM CHLORIDE 9 MG/ML
INJECTION, SOLUTION INTRAVENOUS
Status: COMPLETED
Start: 2019-01-01 | End: 2019-01-01

## 2019-01-01 RX ORDER — MECLIZINE HYDROCHLORIDE 25 MG/1
25 TABLET ORAL 3 TIMES DAILY PRN
Qty: 15 TABLET | Refills: 0 | Status: SHIPPED | OUTPATIENT
Start: 2019-01-01 | End: 2019-01-01

## 2019-01-01 RX ORDER — SODIUM CHLORIDE 9 MG/ML
INJECTION, SOLUTION INTRAVENOUS
Status: DISCONTINUED
Start: 2019-01-01 | End: 2019-01-01 | Stop reason: HOSPADM

## 2019-01-01 RX ORDER — CARVEDILOL 6.25 MG/1
6.25 TABLET ORAL 2 TIMES DAILY WITH MEALS
Status: DISCONTINUED | OUTPATIENT
Start: 2019-01-01 | End: 2019-01-01

## 2019-01-01 RX ORDER — METOPROLOL TARTRATE 5 MG/5ML
5 INJECTION INTRAVENOUS EVERY 6 HOURS
Status: DISCONTINUED | OUTPATIENT
Start: 2019-01-01 | End: 2019-01-01

## 2019-01-01 RX ORDER — ZIPRASIDONE MESYLATE 20 MG/ML
20 INJECTION, POWDER, LYOPHILIZED, FOR SOLUTION INTRAMUSCULAR ONCE
Status: DISCONTINUED | OUTPATIENT
Start: 2019-01-01 | End: 2019-01-01

## 2019-01-01 RX ORDER — ACETAMINOPHEN 325 MG/1
650 TABLET ORAL DAILY PRN
Status: DISCONTINUED | OUTPATIENT
Start: 2019-01-01 | End: 2019-01-01

## 2019-01-01 RX ORDER — DEXTROSE MONOHYDRATE 50 MG/ML
100 INJECTION, SOLUTION INTRAVENOUS PRN
Status: DISCONTINUED | OUTPATIENT
Start: 2019-01-01 | End: 2019-01-01

## 2019-01-01 RX ORDER — ATORVASTATIN CALCIUM 10 MG/1
10 TABLET, FILM COATED ORAL DAILY
Status: DISCONTINUED | OUTPATIENT
Start: 2019-01-01 | End: 2019-01-01

## 2019-01-01 RX ORDER — MAGNESIUM SULFATE IN WATER 40 MG/ML
2 INJECTION, SOLUTION INTRAVENOUS ONCE
Status: COMPLETED | OUTPATIENT
Start: 2019-01-01 | End: 2019-01-01

## 2019-01-01 RX ORDER — GUAIFENESIN 600 MG/1
600 TABLET, EXTENDED RELEASE ORAL 2 TIMES DAILY
Status: DISCONTINUED | OUTPATIENT
Start: 2019-01-01 | End: 2019-01-01 | Stop reason: HOSPADM

## 2019-01-01 RX ORDER — IPRATROPIUM BROMIDE AND ALBUTEROL SULFATE 2.5; .5 MG/3ML; MG/3ML
3 SOLUTION RESPIRATORY (INHALATION) EVERY 4 HOURS PRN
Status: DISCONTINUED | OUTPATIENT
Start: 2019-01-01 | End: 2019-01-01 | Stop reason: HOSPADM

## 2019-01-01 RX ORDER — BENZONATATE 100 MG/1
100 CAPSULE ORAL 3 TIMES DAILY PRN
Status: DISCONTINUED | OUTPATIENT
Start: 2019-01-01 | End: 2019-01-01 | Stop reason: HOSPADM

## 2019-01-01 RX ORDER — SODIUM CHLORIDE 0.9 % (FLUSH) 0.9 %
10 SYRINGE (ML) INJECTION EVERY 12 HOURS SCHEDULED
Status: DISCONTINUED | OUTPATIENT
Start: 2019-01-01 | End: 2019-01-01

## 2019-01-01 RX ORDER — ONDANSETRON 2 MG/ML
4 INJECTION INTRAMUSCULAR; INTRAVENOUS EVERY 6 HOURS PRN
Status: DISCONTINUED | OUTPATIENT
Start: 2019-01-01 | End: 2019-01-01 | Stop reason: HOSPADM

## 2019-01-01 RX ORDER — SENNA PLUS 8.6 MG/1
1 TABLET ORAL DAILY PRN
COMMUNITY
Start: 2019-01-01

## 2019-01-01 RX ORDER — POTASSIUM CHLORIDE 20 MEQ/1
40 TABLET, EXTENDED RELEASE ORAL ONCE
Status: COMPLETED | OUTPATIENT
Start: 2019-01-01 | End: 2019-01-01

## 2019-01-01 RX ORDER — CALCIUM CARBONATE 500(1250)
500 TABLET ORAL 2 TIMES DAILY
Status: ON HOLD | COMMUNITY
End: 2019-01-01 | Stop reason: HOSPADM

## 2019-01-01 RX ORDER — IPRATROPIUM BROMIDE AND ALBUTEROL SULFATE 2.5; .5 MG/3ML; MG/3ML
1 SOLUTION RESPIRATORY (INHALATION) EVERY 4 HOURS
Status: DISCONTINUED | OUTPATIENT
Start: 2019-01-01 | End: 2019-01-01

## 2019-01-01 RX ORDER — 0.9 % SODIUM CHLORIDE 0.9 %
VIAL (ML) INJECTION
Status: COMPLETED
Start: 2019-01-01 | End: 2019-01-01

## 2019-01-01 RX ORDER — MORPHINE SULFATE 2 MG/ML
2 INJECTION, SOLUTION INTRAMUSCULAR; INTRAVENOUS
Status: DISCONTINUED | OUTPATIENT
Start: 2019-01-01 | End: 2019-01-01 | Stop reason: HOSPADM

## 2019-01-01 RX ORDER — SENNA PLUS 8.6 MG/1
1 TABLET ORAL DAILY PRN
Status: DISCONTINUED | OUTPATIENT
Start: 2019-01-01 | End: 2019-01-01 | Stop reason: HOSPADM

## 2019-01-01 RX ORDER — IPRATROPIUM BROMIDE AND ALBUTEROL SULFATE 2.5; .5 MG/3ML; MG/3ML
3 SOLUTION RESPIRATORY (INHALATION) EVERY 4 HOURS PRN
Status: DISCONTINUED | OUTPATIENT
Start: 2019-01-01 | End: 2019-01-01

## 2019-01-01 RX ORDER — ACETYLCYSTEINE 200 MG/ML
SOLUTION ORAL; RESPIRATORY (INHALATION) PRN
Status: DISCONTINUED | OUTPATIENT
Start: 2019-01-01 | End: 2019-01-01 | Stop reason: ALTCHOICE

## 2019-01-01 RX ORDER — NICOTINE POLACRILEX 4 MG
15 LOZENGE BUCCAL PRN
Status: DISCONTINUED | OUTPATIENT
Start: 2019-01-01 | End: 2019-01-01

## 2019-01-01 RX ORDER — GUAIFENESIN 600 MG/1
600 TABLET, EXTENDED RELEASE ORAL 2 TIMES DAILY
Qty: 8 TABLET | Refills: 0
Start: 2019-01-01

## 2019-01-01 RX ORDER — CALCIUM CARBONATE 500(1250)
500 TABLET ORAL 2 TIMES DAILY
Status: DISCONTINUED | OUTPATIENT
Start: 2019-01-01 | End: 2019-01-01 | Stop reason: HOSPADM

## 2019-01-01 RX ORDER — CARVEDILOL 3.12 MG/1
3.12 TABLET ORAL 2 TIMES DAILY WITH MEALS
Status: DISCONTINUED | OUTPATIENT
Start: 2019-01-01 | End: 2019-01-01

## 2019-01-01 RX ORDER — HYDRALAZINE HYDROCHLORIDE 20 MG/ML
10 INJECTION INTRAMUSCULAR; INTRAVENOUS EVERY 6 HOURS PRN
Status: DISCONTINUED | OUTPATIENT
Start: 2019-01-01 | End: 2019-01-01

## 2019-01-01 RX ORDER — BENZONATATE 100 MG/1
100 CAPSULE ORAL 3 TIMES DAILY PRN
Qty: 10 CAPSULE | Refills: 0
Start: 2019-01-01

## 2019-01-01 RX ORDER — LIDOCAINE 4 G/G
1 PATCH TOPICAL DAILY
Qty: 5 PATCH | Refills: 0 | Status: SHIPPED | OUTPATIENT
Start: 2019-01-01

## 2019-01-01 RX ORDER — MORPHINE SULFATE 2 MG/ML
2 INJECTION, SOLUTION INTRAMUSCULAR; INTRAVENOUS
Status: DISCONTINUED | OUTPATIENT
Start: 2019-01-01 | End: 2019-01-01

## 2019-01-01 RX ORDER — LANOLIN ALCOHOL/MO/W.PET/CERES
2000 CREAM (GRAM) TOPICAL DAILY
COMMUNITY

## 2019-01-01 RX ORDER — TAMSULOSIN HYDROCHLORIDE 0.4 MG/1
0.4 CAPSULE ORAL DAILY
Status: DISCONTINUED | OUTPATIENT
Start: 2019-01-01 | End: 2019-01-01 | Stop reason: HOSPADM

## 2019-01-01 RX ORDER — LIDOCAINE 50 MG/G
1 PATCH TOPICAL DAILY
Status: ON HOLD | COMMUNITY
End: 2019-01-01 | Stop reason: CLARIF

## 2019-01-01 RX ORDER — LIDOCAINE 4 G/G
1 PATCH TOPICAL DAILY
Status: DISCONTINUED | OUTPATIENT
Start: 2019-01-01 | End: 2019-01-01 | Stop reason: HOSPADM

## 2019-01-01 RX ORDER — ALENDRONATE SODIUM 70 MG/1
70 TABLET ORAL
Status: DISCONTINUED | OUTPATIENT
Start: 2019-01-01 | End: 2019-01-01

## 2019-01-01 RX ORDER — LISINOPRIL 20 MG/1
20 TABLET ORAL DAILY
Status: DISCONTINUED | OUTPATIENT
Start: 2019-01-01 | End: 2019-01-01

## 2019-01-01 RX ORDER — SPIRONOLACTONE 25 MG/1
12.5 TABLET ORAL DAILY
Status: DISCONTINUED | OUTPATIENT
Start: 2019-01-01 | End: 2019-01-01 | Stop reason: HOSPADM

## 2019-01-01 RX ORDER — LORAZEPAM 2 MG/ML
0.5 INJECTION INTRAMUSCULAR EVERY 4 HOURS PRN
Status: DISCONTINUED | OUTPATIENT
Start: 2019-01-01 | End: 2019-01-01 | Stop reason: HOSPADM

## 2019-01-01 RX ORDER — IPRATROPIUM BROMIDE AND ALBUTEROL SULFATE 2.5; .5 MG/3ML; MG/3ML
1 SOLUTION RESPIRATORY (INHALATION)
Status: DISCONTINUED | OUTPATIENT
Start: 2019-01-01 | End: 2019-01-01

## 2019-01-01 RX ORDER — DEXTROSE MONOHYDRATE 25 G/50ML
12.5 INJECTION, SOLUTION INTRAVENOUS PRN
Status: DISCONTINUED | OUTPATIENT
Start: 2019-01-01 | End: 2019-01-01 | Stop reason: HOSPADM

## 2019-01-01 RX ORDER — IPRATROPIUM BROMIDE AND ALBUTEROL SULFATE 2.5; .5 MG/3ML; MG/3ML
1 SOLUTION RESPIRATORY (INHALATION)
Status: DISCONTINUED | OUTPATIENT
Start: 2019-01-01 | End: 2019-01-01 | Stop reason: HOSPADM

## 2019-01-01 RX ORDER — IPRATROPIUM BROMIDE AND ALBUTEROL SULFATE 2.5; .5 MG/3ML; MG/3ML
2 SOLUTION RESPIRATORY (INHALATION) ONCE
Status: COMPLETED | OUTPATIENT
Start: 2019-01-01 | End: 2019-01-01

## 2019-01-01 RX ORDER — SPIRONOLACTONE 25 MG/1
12.5 TABLET ORAL DAILY
Qty: 30 TABLET | Refills: 3 | Status: SHIPPED | OUTPATIENT
Start: 2019-01-01 | End: 2019-01-01

## 2019-01-01 RX ORDER — FUROSEMIDE 10 MG/ML
40 INJECTION INTRAMUSCULAR; INTRAVENOUS 2 TIMES DAILY
Status: DISCONTINUED | OUTPATIENT
Start: 2019-01-01 | End: 2019-01-01

## 2019-01-01 RX ORDER — ATORVASTATIN CALCIUM 10 MG/1
10 TABLET, FILM COATED ORAL DAILY
Status: DISCONTINUED | OUTPATIENT
Start: 2019-01-01 | End: 2019-01-01 | Stop reason: HOSPADM

## 2019-01-01 RX ORDER — SODIUM CHLORIDE FOR INHALATION 0.9 %
3 VIAL, NEBULIZER (ML) INHALATION EVERY 4 HOURS
Status: COMPLETED | OUTPATIENT
Start: 2019-01-01 | End: 2019-01-01

## 2019-01-01 RX ORDER — ACETAMINOPHEN 325 MG/1
650 TABLET ORAL EVERY 4 HOURS PRN
Status: DISCONTINUED | OUTPATIENT
Start: 2019-01-01 | End: 2019-01-01 | Stop reason: HOSPADM

## 2019-01-01 RX ORDER — FUROSEMIDE 40 MG/1
40 TABLET ORAL DAILY
Status: DISCONTINUED | OUTPATIENT
Start: 2019-01-01 | End: 2019-01-01 | Stop reason: HOSPADM

## 2019-01-01 RX ORDER — SODIUM CHLORIDE 0.9 % (FLUSH) 0.9 %
10 SYRINGE (ML) INJECTION PRN
Status: DISCONTINUED | OUTPATIENT
Start: 2019-01-01 | End: 2019-01-01

## 2019-01-01 RX ORDER — HALOPERIDOL 5 MG/ML
2 INJECTION INTRAMUSCULAR EVERY 4 HOURS PRN
Status: DISCONTINUED | OUTPATIENT
Start: 2019-01-01 | End: 2019-01-01

## 2019-01-01 RX ORDER — FUROSEMIDE 40 MG/1
TABLET ORAL
Qty: 30 TABLET | Refills: 3 | Status: SHIPPED | OUTPATIENT
Start: 2019-01-01

## 2019-01-01 RX ORDER — MAGNESIUM HYDROXIDE 1200 MG/15ML
LIQUID ORAL CONTINUOUS PRN
Status: COMPLETED | OUTPATIENT
Start: 2019-01-01 | End: 2019-01-01

## 2019-01-01 RX ORDER — CARVEDILOL 3.12 MG/1
3.12 TABLET ORAL 2 TIMES DAILY WITH MEALS
Status: DISCONTINUED | OUTPATIENT
Start: 2019-01-01 | End: 2019-01-01 | Stop reason: HOSPADM

## 2019-01-01 RX ADMIN — IPRATROPIUM BROMIDE AND ALBUTEROL SULFATE 1 AMPULE: .5; 3 SOLUTION RESPIRATORY (INHALATION) at 15:28

## 2019-01-01 RX ADMIN — INSULIN LISPRO 1 UNITS: 100 INJECTION, SOLUTION INTRAVENOUS; SUBCUTANEOUS at 17:04

## 2019-01-01 RX ADMIN — VANCOMYCIN HYDROCHLORIDE 750 MG: 10 INJECTION, POWDER, LYOPHILIZED, FOR SOLUTION INTRAVENOUS at 15:16

## 2019-01-01 RX ADMIN — Medication 10 ML: at 21:06

## 2019-01-01 RX ADMIN — CARVEDILOL 3.12 MG: 3.12 TABLET, FILM COATED ORAL at 11:28

## 2019-01-01 RX ADMIN — SODIUM CHLORIDE: 9 INJECTION, SOLUTION INTRAVENOUS at 01:21

## 2019-01-01 RX ADMIN — MICONAZOLE NITRATE: 20 POWDER TOPICAL at 08:23

## 2019-01-01 RX ADMIN — Medication 10 ML: at 08:19

## 2019-01-01 RX ADMIN — IPRATROPIUM BROMIDE AND ALBUTEROL SULFATE 1 AMPULE: .5; 3 SOLUTION RESPIRATORY (INHALATION) at 16:17

## 2019-01-01 RX ADMIN — METOPROLOL TARTRATE 5 MG: 5 INJECTION INTRAVENOUS at 21:07

## 2019-01-01 RX ADMIN — IPRATROPIUM BROMIDE AND ALBUTEROL SULFATE 1 AMPULE: .5; 3 SOLUTION RESPIRATORY (INHALATION) at 20:42

## 2019-01-01 RX ADMIN — IPRATROPIUM BROMIDE AND ALBUTEROL SULFATE 1 AMPULE: .5; 3 SOLUTION RESPIRATORY (INHALATION) at 08:21

## 2019-01-01 RX ADMIN — BENZONATATE 100 MG: 100 CAPSULE ORAL at 14:11

## 2019-01-01 RX ADMIN — CEFTRIAXONE SODIUM 1 G: 1 INJECTION, POWDER, FOR SOLUTION INTRAMUSCULAR; INTRAVENOUS at 06:28

## 2019-01-01 RX ADMIN — INSULIN LISPRO 2 UNITS: 100 INJECTION, SOLUTION INTRAVENOUS; SUBCUTANEOUS at 16:41

## 2019-01-01 RX ADMIN — INSULIN LISPRO 1 UNITS: 100 INJECTION, SOLUTION INTRAVENOUS; SUBCUTANEOUS at 10:39

## 2019-01-01 RX ADMIN — FUROSEMIDE 40 MG: 10 INJECTION, SOLUTION INTRAMUSCULAR; INTRAVENOUS at 09:50

## 2019-01-01 RX ADMIN — LORAZEPAM 0.5 MG: 2 INJECTION INTRAMUSCULAR; INTRAVENOUS at 03:12

## 2019-01-01 RX ADMIN — MORPHINE SULFATE 2 MG: 2 INJECTION, SOLUTION INTRAMUSCULAR; INTRAVENOUS at 16:44

## 2019-01-01 RX ADMIN — IPRATROPIUM BROMIDE AND ALBUTEROL SULFATE 1 AMPULE: .5; 3 SOLUTION RESPIRATORY (INHALATION) at 23:37

## 2019-01-01 RX ADMIN — ENOXAPARIN SODIUM 40 MG: 40 INJECTION SUBCUTANEOUS at 10:52

## 2019-01-01 RX ADMIN — ENOXAPARIN SODIUM 40 MG: 40 INJECTION SUBCUTANEOUS at 08:33

## 2019-01-01 RX ADMIN — CARVEDILOL 3.12 MG: 3.12 TABLET, FILM COATED ORAL at 10:34

## 2019-01-01 RX ADMIN — IPRATROPIUM BROMIDE AND ALBUTEROL SULFATE 1 AMPULE: .5; 3 SOLUTION RESPIRATORY (INHALATION) at 20:08

## 2019-01-01 RX ADMIN — MEROPENEM 1 G: 1 INJECTION, POWDER, FOR SOLUTION INTRAVENOUS at 18:34

## 2019-01-01 RX ADMIN — POTASSIUM CHLORIDE 40 MEQ: 20 TABLET, EXTENDED RELEASE ORAL at 08:46

## 2019-01-01 RX ADMIN — FUROSEMIDE 40 MG: 10 INJECTION, SOLUTION INTRAMUSCULAR; INTRAVENOUS at 05:50

## 2019-01-01 RX ADMIN — IPRATROPIUM BROMIDE AND ALBUTEROL SULFATE 1 AMPULE: .5; 3 SOLUTION RESPIRATORY (INHALATION) at 12:26

## 2019-01-01 RX ADMIN — IPRATROPIUM BROMIDE AND ALBUTEROL SULFATE 1 AMPULE: .5; 3 SOLUTION RESPIRATORY (INHALATION) at 15:43

## 2019-01-01 RX ADMIN — Medication 10 ML: at 09:50

## 2019-01-01 RX ADMIN — Medication 10 ML: at 20:29

## 2019-01-01 RX ADMIN — INSULIN LISPRO 1 UNITS: 100 INJECTION, SOLUTION INTRAVENOUS; SUBCUTANEOUS at 21:27

## 2019-01-01 RX ADMIN — IPRATROPIUM BROMIDE AND ALBUTEROL SULFATE 1 AMPULE: .5; 3 SOLUTION RESPIRATORY (INHALATION) at 20:13

## 2019-01-01 RX ADMIN — ISODIUM CHLORIDE 3 ML: 0.03 SOLUTION RESPIRATORY (INHALATION) at 00:03

## 2019-01-01 RX ADMIN — CEFTRIAXONE SODIUM 1 G: 1 INJECTION, POWDER, FOR SOLUTION INTRAMUSCULAR; INTRAVENOUS at 20:32

## 2019-01-01 RX ADMIN — CARVEDILOL 3.12 MG: 3.12 TABLET, FILM COATED ORAL at 08:33

## 2019-01-01 RX ADMIN — CALCIUM 500 MG: 500 TABLET ORAL at 10:35

## 2019-01-01 RX ADMIN — INSULIN LISPRO 1 UNITS: 100 INJECTION, SOLUTION INTRAVENOUS; SUBCUTANEOUS at 16:15

## 2019-01-01 RX ADMIN — METOPROLOL TARTRATE 5 MG: 5 INJECTION INTRAVENOUS at 15:17

## 2019-01-01 RX ADMIN — ENOXAPARIN SODIUM 40 MG: 40 INJECTION SUBCUTANEOUS at 08:28

## 2019-01-01 RX ADMIN — IPRATROPIUM BROMIDE AND ALBUTEROL SULFATE 1 AMPULE: .5; 3 SOLUTION RESPIRATORY (INHALATION) at 12:02

## 2019-01-01 RX ADMIN — HALOPERIDOL LACTATE 2 MG: 5 INJECTION, SOLUTION INTRAMUSCULAR at 01:03

## 2019-01-01 RX ADMIN — CEFTRIAXONE SODIUM 1 G: 1 INJECTION, POWDER, FOR SOLUTION INTRAMUSCULAR; INTRAVENOUS at 21:15

## 2019-01-01 RX ADMIN — IPRATROPIUM BROMIDE AND ALBUTEROL SULFATE 1 AMPULE: .5; 3 SOLUTION RESPIRATORY (INHALATION) at 20:02

## 2019-01-01 RX ADMIN — IPRATROPIUM BROMIDE AND ALBUTEROL SULFATE 1 AMPULE: .5; 3 SOLUTION RESPIRATORY (INHALATION) at 11:40

## 2019-01-01 RX ADMIN — MECLIZINE 25 MG: 12.5 TABLET ORAL at 08:03

## 2019-01-01 RX ADMIN — INSULIN LISPRO 3 UNITS: 100 INJECTION, SOLUTION INTRAVENOUS; SUBCUTANEOUS at 11:51

## 2019-01-01 RX ADMIN — GUAIFENESIN 600 MG: 600 TABLET, EXTENDED RELEASE ORAL at 08:46

## 2019-01-01 RX ADMIN — LIDOCAINE HYDROCHLORIDE 5 ML: 10 INJECTION, SOLUTION EPIDURAL; INFILTRATION; INTRACAUDAL; PERINEURAL at 16:51

## 2019-01-01 RX ADMIN — IPRATROPIUM BROMIDE AND ALBUTEROL SULFATE 1 AMPULE: .5; 3 SOLUTION RESPIRATORY (INHALATION) at 09:01

## 2019-01-01 RX ADMIN — FINASTERIDE 5 MG: 5 TABLET, FILM COATED ORAL at 11:28

## 2019-01-01 RX ADMIN — METOPROLOL TARTRATE 5 MG: 5 INJECTION INTRAVENOUS at 09:10

## 2019-01-01 RX ADMIN — Medication 10 ML: at 08:24

## 2019-01-01 RX ADMIN — SPIRONOLACTONE 12.5 MG: 25 TABLET ORAL at 08:46

## 2019-01-01 RX ADMIN — IPRATROPIUM BROMIDE AND ALBUTEROL SULFATE 1 AMPULE: .5; 3 SOLUTION RESPIRATORY (INHALATION) at 08:59

## 2019-01-01 RX ADMIN — ACETAMINOPHEN 650 MG: 325 TABLET ORAL at 00:36

## 2019-01-01 RX ADMIN — IPRATROPIUM BROMIDE AND ALBUTEROL SULFATE 1 AMPULE: .5; 3 SOLUTION RESPIRATORY (INHALATION) at 23:34

## 2019-01-01 RX ADMIN — MEROPENEM 1 G: 1 INJECTION, POWDER, FOR SOLUTION INTRAVENOUS at 12:20

## 2019-01-01 RX ADMIN — ISODIUM CHLORIDE 3 ML: 0.03 SOLUTION RESPIRATORY (INHALATION) at 15:31

## 2019-01-01 RX ADMIN — MAGNESIUM SULFATE HEPTAHYDRATE 2 G: 40 INJECTION, SOLUTION INTRAVENOUS at 11:56

## 2019-01-01 RX ADMIN — MEROPENEM 1 G: 1 INJECTION, POWDER, FOR SOLUTION INTRAVENOUS at 11:39

## 2019-01-01 RX ADMIN — MEROPENEM 1 G: 1 INJECTION, POWDER, FOR SOLUTION INTRAVENOUS at 03:25

## 2019-01-01 RX ADMIN — FUROSEMIDE 40 MG: 10 INJECTION, SOLUTION INTRAMUSCULAR; INTRAVENOUS at 17:04

## 2019-01-01 RX ADMIN — MEROPENEM 1 G: 1 INJECTION, POWDER, FOR SOLUTION INTRAVENOUS at 21:00

## 2019-01-01 RX ADMIN — CARVEDILOL 3.12 MG: 3.12 TABLET, FILM COATED ORAL at 16:49

## 2019-01-01 RX ADMIN — FINASTERIDE 5 MG: 5 TABLET, FILM COATED ORAL at 10:34

## 2019-01-01 RX ADMIN — SERTRALINE HYDROCHLORIDE 50 MG: 50 TABLET ORAL at 10:34

## 2019-01-01 RX ADMIN — ATORVASTATIN CALCIUM 10 MG: 10 TABLET, FILM COATED ORAL at 10:34

## 2019-01-01 RX ADMIN — ZIPRASIDONE MESYLATE 20 MG: 20 INJECTION, POWDER, LYOPHILIZED, FOR SOLUTION INTRAMUSCULAR at 03:00

## 2019-01-01 RX ADMIN — Medication 10 ML: at 21:31

## 2019-01-01 RX ADMIN — INSULIN LISPRO 1 UNITS: 100 INJECTION, SOLUTION INTRAVENOUS; SUBCUTANEOUS at 21:30

## 2019-01-01 RX ADMIN — IPRATROPIUM BROMIDE AND ALBUTEROL SULFATE 1 AMPULE: .5; 3 SOLUTION RESPIRATORY (INHALATION) at 03:55

## 2019-01-01 RX ADMIN — METOPROLOL TARTRATE 5 MG: 5 INJECTION INTRAVENOUS at 04:04

## 2019-01-01 RX ADMIN — IPRATROPIUM BROMIDE AND ALBUTEROL SULFATE 1 AMPULE: .5; 3 SOLUTION RESPIRATORY (INHALATION) at 11:27

## 2019-01-01 RX ADMIN — METOPROLOL TARTRATE 5 MG: 5 INJECTION INTRAVENOUS at 03:12

## 2019-01-01 RX ADMIN — INSULIN LISPRO 1 UNITS: 100 INJECTION, SOLUTION INTRAVENOUS; SUBCUTANEOUS at 09:57

## 2019-01-01 RX ADMIN — ENOXAPARIN SODIUM 40 MG: 40 INJECTION SUBCUTANEOUS at 10:34

## 2019-01-01 RX ADMIN — ATORVASTATIN CALCIUM 10 MG: 10 TABLET, FILM COATED ORAL at 08:32

## 2019-01-01 RX ADMIN — ENOXAPARIN SODIUM 40 MG: 40 INJECTION SUBCUTANEOUS at 08:27

## 2019-01-01 RX ADMIN — Medication 10 ML: at 20:58

## 2019-01-01 RX ADMIN — ATORVASTATIN CALCIUM 10 MG: 10 TABLET, FILM COATED ORAL at 11:27

## 2019-01-01 RX ADMIN — MICONAZOLE NITRATE: 20 POWDER TOPICAL at 21:08

## 2019-01-01 RX ADMIN — MICONAZOLE NITRATE: 20 POWDER TOPICAL at 19:44

## 2019-01-01 RX ADMIN — Medication 10 ML: at 19:44

## 2019-01-01 RX ADMIN — SERTRALINE HYDROCHLORIDE 50 MG: 50 TABLET ORAL at 10:52

## 2019-01-01 RX ADMIN — IPRATROPIUM BROMIDE AND ALBUTEROL SULFATE 1 AMPULE: .5; 3 SOLUTION RESPIRATORY (INHALATION) at 07:52

## 2019-01-01 RX ADMIN — Medication 10 ML: at 21:10

## 2019-01-01 RX ADMIN — FINASTERIDE 5 MG: 5 TABLET, FILM COATED ORAL at 08:33

## 2019-01-01 RX ADMIN — ENOXAPARIN SODIUM 40 MG: 40 INJECTION SUBCUTANEOUS at 08:45

## 2019-01-01 RX ADMIN — Medication 10 ML: at 22:08

## 2019-01-01 RX ADMIN — TAMSULOSIN HYDROCHLORIDE 0.4 MG: 0.4 CAPSULE ORAL at 08:33

## 2019-01-01 RX ADMIN — METOPROLOL TARTRATE 5 MG: 5 INJECTION INTRAVENOUS at 16:13

## 2019-01-01 RX ADMIN — CALCIUM 500 MG: 500 TABLET ORAL at 08:46

## 2019-01-01 RX ADMIN — MORPHINE SULFATE 2 MG: 2 INJECTION, SOLUTION INTRAMUSCULAR; INTRAVENOUS at 03:13

## 2019-01-01 RX ADMIN — LORAZEPAM 0.5 MG: 2 INJECTION INTRAMUSCULAR; INTRAVENOUS at 16:36

## 2019-01-01 RX ADMIN — LISINOPRIL 20 MG: 20 TABLET ORAL at 08:27

## 2019-01-01 RX ADMIN — CEFTRIAXONE SODIUM 1 G: 1 INJECTION, POWDER, FOR SOLUTION INTRAMUSCULAR; INTRAVENOUS at 19:53

## 2019-01-01 RX ADMIN — METOPROLOL TARTRATE 5 MG: 5 INJECTION INTRAVENOUS at 08:27

## 2019-01-01 RX ADMIN — TAMSULOSIN HYDROCHLORIDE 0.4 MG: 0.4 CAPSULE ORAL at 09:49

## 2019-01-01 RX ADMIN — INSULIN LISPRO 1 UNITS: 100 INJECTION, SOLUTION INTRAVENOUS; SUBCUTANEOUS at 18:13

## 2019-01-01 RX ADMIN — MEROPENEM 1 G: 1 INJECTION, POWDER, FOR SOLUTION INTRAVENOUS at 04:04

## 2019-01-01 RX ADMIN — HYDRALAZINE HYDROCHLORIDE 10 MG: 20 INJECTION INTRAMUSCULAR; INTRAVENOUS at 11:39

## 2019-01-01 RX ADMIN — TAMSULOSIN HYDROCHLORIDE 0.4 MG: 0.4 CAPSULE ORAL at 10:34

## 2019-01-01 RX ADMIN — Medication 10 ML: at 19:53

## 2019-01-01 RX ADMIN — IPRATROPIUM BROMIDE AND ALBUTEROL SULFATE 1 AMPULE: .5; 3 SOLUTION RESPIRATORY (INHALATION) at 12:04

## 2019-01-01 RX ADMIN — IPRATROPIUM BROMIDE AND ALBUTEROL SULFATE 2 AMPULE: .5; 3 SOLUTION RESPIRATORY (INHALATION) at 04:47

## 2019-01-01 RX ADMIN — SODIUM CHLORIDE 250 ML: 9 INJECTION, SOLUTION INTRAVENOUS at 06:06

## 2019-01-01 RX ADMIN — CARVEDILOL 3.12 MG: 3.12 TABLET, FILM COATED ORAL at 17:03

## 2019-01-01 RX ADMIN — Medication 10 ML: at 22:31

## 2019-01-01 RX ADMIN — CARVEDILOL 3.12 MG: 3.12 TABLET, FILM COATED ORAL at 09:49

## 2019-01-01 RX ADMIN — SODIUM CHLORIDE 500 ML: 9 INJECTION, SOLUTION INTRAVENOUS at 05:31

## 2019-01-01 RX ADMIN — MICONAZOLE NITRATE: 20 POWDER TOPICAL at 08:18

## 2019-01-01 RX ADMIN — HYDRALAZINE HYDROCHLORIDE 10 MG: 20 INJECTION INTRAMUSCULAR; INTRAVENOUS at 22:24

## 2019-01-01 RX ADMIN — FUROSEMIDE 40 MG: 10 INJECTION, SOLUTION INTRAMUSCULAR; INTRAVENOUS at 18:18

## 2019-01-01 RX ADMIN — GUAIFENESIN 600 MG: 600 TABLET, EXTENDED RELEASE ORAL at 21:22

## 2019-01-01 RX ADMIN — METOPROLOL TARTRATE 5 MG: 5 INJECTION INTRAVENOUS at 13:22

## 2019-01-01 RX ADMIN — IPRATROPIUM BROMIDE AND ALBUTEROL SULFATE 1 AMPULE: .5; 3 SOLUTION RESPIRATORY (INHALATION) at 12:27

## 2019-01-01 RX ADMIN — VANCOMYCIN HYDROCHLORIDE 750 MG: 10 INJECTION, POWDER, LYOPHILIZED, FOR SOLUTION INTRAVENOUS at 06:05

## 2019-01-01 RX ADMIN — MICONAZOLE NITRATE: 20 POWDER TOPICAL at 10:52

## 2019-01-01 RX ADMIN — SENNOSIDES 8.6 MG: 8.6 TABLET, FILM COATED ORAL at 11:28

## 2019-01-01 RX ADMIN — MAGNESIUM SULFATE HEPTAHYDRATE 2 G: 40 INJECTION, SOLUTION INTRAVENOUS at 08:45

## 2019-01-01 RX ADMIN — CARVEDILOL 6.25 MG: 6.25 TABLET, FILM COATED ORAL at 17:13

## 2019-01-01 RX ADMIN — GUAIFENESIN 600 MG: 600 TABLET, EXTENDED RELEASE ORAL at 21:26

## 2019-01-01 RX ADMIN — Medication 10 ML: at 09:05

## 2019-01-01 RX ADMIN — Medication 10 ML: at 11:29

## 2019-01-01 RX ADMIN — SPIRONOLACTONE 12.5 MG: 25 TABLET ORAL at 14:02

## 2019-01-01 RX ADMIN — TAMSULOSIN HYDROCHLORIDE 0.4 MG: 0.4 CAPSULE ORAL at 08:46

## 2019-01-01 RX ADMIN — FUROSEMIDE 40 MG: 40 TABLET ORAL at 10:34

## 2019-01-01 RX ADMIN — CALCIUM 500 MG: 500 TABLET ORAL at 21:05

## 2019-01-01 RX ADMIN — FINASTERIDE 5 MG: 5 TABLET, FILM COATED ORAL at 08:46

## 2019-01-01 RX ADMIN — IPRATROPIUM BROMIDE AND ALBUTEROL SULFATE 1 AMPULE: .5; 3 SOLUTION RESPIRATORY (INHALATION) at 08:00

## 2019-01-01 RX ADMIN — INSULIN LISPRO 1 UNITS: 100 INJECTION, SOLUTION INTRAVENOUS; SUBCUTANEOUS at 12:15

## 2019-01-01 RX ADMIN — GUAIFENESIN 600 MG: 600 TABLET, EXTENDED RELEASE ORAL at 14:02

## 2019-01-01 RX ADMIN — LORAZEPAM 0.5 MG: 2 INJECTION INTRAMUSCULAR; INTRAVENOUS at 09:10

## 2019-01-01 RX ADMIN — FUROSEMIDE 40 MG: 40 TABLET ORAL at 08:46

## 2019-01-01 RX ADMIN — SERTRALINE HYDROCHLORIDE 50 MG: 50 TABLET ORAL at 08:46

## 2019-01-01 RX ADMIN — INSULIN LISPRO 1 UNITS: 100 INJECTION, SOLUTION INTRAVENOUS; SUBCUTANEOUS at 21:18

## 2019-01-01 RX ADMIN — ENOXAPARIN SODIUM 40 MG: 40 INJECTION SUBCUTANEOUS at 09:51

## 2019-01-01 RX ADMIN — IPRATROPIUM BROMIDE AND ALBUTEROL SULFATE 3 ML: .5; 3 SOLUTION RESPIRATORY (INHALATION) at 11:49

## 2019-01-01 RX ADMIN — Medication 10 ML: at 08:27

## 2019-01-01 RX ADMIN — CYANOCOBALAMIN TAB 500 MCG 2000 MCG: 500 TAB at 10:52

## 2019-01-01 RX ADMIN — MEROPENEM 1 G: 1 INJECTION, POWDER, FOR SOLUTION INTRAVENOUS at 13:16

## 2019-01-01 RX ADMIN — IPRATROPIUM BROMIDE AND ALBUTEROL SULFATE 1 AMPULE: .5; 3 SOLUTION RESPIRATORY (INHALATION) at 16:27

## 2019-01-01 RX ADMIN — LISINOPRIL 20 MG: 20 TABLET ORAL at 09:37

## 2019-01-01 RX ADMIN — MICONAZOLE NITRATE: 20 POWDER TOPICAL at 17:13

## 2019-01-01 RX ADMIN — ENOXAPARIN SODIUM 40 MG: 40 INJECTION SUBCUTANEOUS at 08:23

## 2019-01-01 RX ADMIN — SPIRONOLACTONE 12.5 MG: 25 TABLET ORAL at 10:34

## 2019-01-01 RX ADMIN — HYDRALAZINE HYDROCHLORIDE 10 MG: 20 INJECTION INTRAMUSCULAR; INTRAVENOUS at 04:53

## 2019-01-01 RX ADMIN — Medication 10 ML: at 08:33

## 2019-01-01 RX ADMIN — MICONAZOLE NITRATE: 20 POWDER TOPICAL at 19:53

## 2019-01-01 RX ADMIN — ISODIUM CHLORIDE 3 ML: 0.03 SOLUTION RESPIRATORY (INHALATION) at 20:22

## 2019-01-01 RX ADMIN — MICONAZOLE NITRATE: 20 POWDER TOPICAL at 08:31

## 2019-01-01 RX ADMIN — CALCIUM 500 MG: 500 TABLET ORAL at 11:28

## 2019-01-01 RX ADMIN — INSULIN LISPRO 1 UNITS: 100 INJECTION, SOLUTION INTRAVENOUS; SUBCUTANEOUS at 21:00

## 2019-01-01 RX ADMIN — IPRATROPIUM BROMIDE AND ALBUTEROL SULFATE 1 AMPULE: .5; 3 SOLUTION RESPIRATORY (INHALATION) at 07:56

## 2019-01-01 RX ADMIN — Medication 10 ML: at 21:07

## 2019-01-01 RX ADMIN — FUROSEMIDE 40 MG: 10 INJECTION, SOLUTION INTRAMUSCULAR; INTRAVENOUS at 08:33

## 2019-01-01 RX ADMIN — IPRATROPIUM BROMIDE AND ALBUTEROL SULFATE 1 AMPULE: .5; 3 SOLUTION RESPIRATORY (INHALATION) at 11:14

## 2019-01-01 RX ADMIN — Medication 10 ML: at 08:32

## 2019-01-01 RX ADMIN — INSULIN LISPRO 1 UNITS: 100 INJECTION, SOLUTION INTRAVENOUS; SUBCUTANEOUS at 21:20

## 2019-01-01 RX ADMIN — CALCIUM 500 MG: 500 TABLET ORAL at 21:22

## 2019-01-01 RX ADMIN — METOPROLOL TARTRATE 5 MG: 5 INJECTION INTRAVENOUS at 08:18

## 2019-01-01 RX ADMIN — FUROSEMIDE 40 MG: 10 INJECTION, SOLUTION INTRAMUSCULAR; INTRAVENOUS at 18:42

## 2019-01-01 RX ADMIN — CALCIUM 500 MG: 500 TABLET ORAL at 21:26

## 2019-01-01 RX ADMIN — IPRATROPIUM BROMIDE AND ALBUTEROL SULFATE 1 AMPULE: .5; 3 SOLUTION RESPIRATORY (INHALATION) at 16:14

## 2019-01-01 RX ADMIN — SERTRALINE HYDROCHLORIDE 50 MG: 50 TABLET ORAL at 08:33

## 2019-01-01 RX ADMIN — SERTRALINE HYDROCHLORIDE 50 MG: 50 TABLET ORAL at 09:49

## 2019-01-01 RX ADMIN — MICONAZOLE NITRATE: 20 POWDER TOPICAL at 09:00

## 2019-01-01 RX ADMIN — CYANOCOBALAMIN TAB 500 MCG 2000 MCG: 500 TAB at 08:27

## 2019-01-01 RX ADMIN — Medication 10 ML: at 21:09

## 2019-01-01 RX ADMIN — CALCIUM 500 MG: 500 TABLET ORAL at 20:58

## 2019-01-01 RX ADMIN — ENOXAPARIN SODIUM 40 MG: 40 INJECTION SUBCUTANEOUS at 08:18

## 2019-01-01 RX ADMIN — HALOPERIDOL LACTATE 2 MG: 5 INJECTION, SOLUTION INTRAMUSCULAR at 23:25

## 2019-01-01 RX ADMIN — MEROPENEM 1 G: 1 INJECTION, POWDER, FOR SOLUTION INTRAVENOUS at 02:11

## 2019-01-01 RX ADMIN — IPRATROPIUM BROMIDE AND ALBUTEROL SULFATE 1 AMPULE: .5; 3 SOLUTION RESPIRATORY (INHALATION) at 11:57

## 2019-01-01 RX ADMIN — SODIUM CHLORIDE: 9 INJECTION, SOLUTION INTRAMUSCULAR; INTRAVENOUS; SUBCUTANEOUS at 03:08

## 2019-01-01 RX ADMIN — CARVEDILOL 3.12 MG: 3.12 TABLET, FILM COATED ORAL at 16:15

## 2019-01-01 RX ADMIN — ATORVASTATIN CALCIUM 10 MG: 10 TABLET, FILM COATED ORAL at 08:46

## 2019-01-01 RX ADMIN — IPRATROPIUM BROMIDE AND ALBUTEROL SULFATE 1 AMPULE: .5; 3 SOLUTION RESPIRATORY (INHALATION) at 08:25

## 2019-01-01 RX ADMIN — ATORVASTATIN CALCIUM 10 MG: 10 TABLET, FILM COATED ORAL at 09:49

## 2019-01-01 RX ADMIN — HYDRALAZINE HYDROCHLORIDE 10 MG: 20 INJECTION INTRAMUSCULAR; INTRAVENOUS at 17:08

## 2019-01-01 RX ADMIN — GUAIFENESIN 600 MG: 600 TABLET, EXTENDED RELEASE ORAL at 11:50

## 2019-01-01 RX ADMIN — SERTRALINE HYDROCHLORIDE 50 MG: 50 TABLET ORAL at 08:27

## 2019-01-01 RX ADMIN — INSULIN LISPRO 1 UNITS: 100 INJECTION, SOLUTION INTRAVENOUS; SUBCUTANEOUS at 21:07

## 2019-01-01 RX ADMIN — ATORVASTATIN CALCIUM 10 MG: 10 TABLET, FILM COATED ORAL at 08:27

## 2019-01-01 RX ADMIN — INSULIN LISPRO 1 UNITS: 100 INJECTION, SOLUTION INTRAVENOUS; SUBCUTANEOUS at 08:47

## 2019-01-01 RX ADMIN — MORPHINE SULFATE 2 MG: 2 INJECTION, SOLUTION INTRAMUSCULAR; INTRAVENOUS at 09:09

## 2019-01-01 RX ADMIN — CARVEDILOL 3.12 MG: 3.12 TABLET, FILM COATED ORAL at 16:24

## 2019-01-01 RX ADMIN — HALOPERIDOL LACTATE 2 MG: 5 INJECTION, SOLUTION INTRAMUSCULAR at 17:59

## 2019-01-01 RX ADMIN — SERTRALINE HYDROCHLORIDE 50 MG: 50 TABLET ORAL at 09:37

## 2019-01-01 RX ADMIN — ENOXAPARIN SODIUM 40 MG: 40 INJECTION SUBCUTANEOUS at 09:37

## 2019-01-01 RX ADMIN — ATORVASTATIN CALCIUM 10 MG: 10 TABLET, FILM COATED ORAL at 09:37

## 2019-01-01 RX ADMIN — CARVEDILOL 6.25 MG: 6.25 TABLET, FILM COATED ORAL at 08:27

## 2019-01-01 RX ADMIN — IPRATROPIUM BROMIDE AND ALBUTEROL SULFATE 1 AMPULE: .5; 3 SOLUTION RESPIRATORY (INHALATION) at 20:22

## 2019-01-01 RX ADMIN — MEROPENEM 1 G: 1 INJECTION, POWDER, FOR SOLUTION INTRAVENOUS at 21:07

## 2019-01-01 RX ADMIN — MICONAZOLE NITRATE: 20 POWDER TOPICAL at 21:06

## 2019-01-01 RX ADMIN — IPRATROPIUM BROMIDE AND ALBUTEROL SULFATE 1 AMPULE: .5; 3 SOLUTION RESPIRATORY (INHALATION) at 08:03

## 2019-01-01 RX ADMIN — IPRATROPIUM BROMIDE AND ALBUTEROL SULFATE 1 AMPULE: .5; 3 SOLUTION RESPIRATORY (INHALATION) at 03:46

## 2019-01-01 RX ADMIN — INSULIN LISPRO 1 UNITS: 100 INJECTION, SOLUTION INTRAVENOUS; SUBCUTANEOUS at 08:27

## 2019-01-01 RX ADMIN — IPRATROPIUM BROMIDE AND ALBUTEROL SULFATE 1 AMPULE: .5; 3 SOLUTION RESPIRATORY (INHALATION) at 15:51

## 2019-01-01 RX ADMIN — METOPROLOL TARTRATE 5 MG: 5 INJECTION INTRAVENOUS at 21:00

## 2019-01-01 RX ADMIN — VANCOMYCIN HYDROCHLORIDE 750 MG: 10 INJECTION, POWDER, LYOPHILIZED, FOR SOLUTION INTRAVENOUS at 13:44

## 2019-01-01 RX ADMIN — IPRATROPIUM BROMIDE AND ALBUTEROL SULFATE 1 AMPULE: .5; 3 SOLUTION RESPIRATORY (INHALATION) at 15:31

## 2019-01-01 RX ADMIN — INSULIN LISPRO 1 UNITS: 100 INJECTION, SOLUTION INTRAVENOUS; SUBCUTANEOUS at 12:37

## 2019-01-01 RX ADMIN — Medication 10 ML: at 10:35

## 2019-01-01 RX ADMIN — Medication 10 ML: at 08:46

## 2019-01-01 RX ADMIN — IPRATROPIUM BROMIDE AND ALBUTEROL SULFATE 1 AMPULE: .5; 3 SOLUTION RESPIRATORY (INHALATION) at 07:58

## 2019-01-01 RX ADMIN — IPRATROPIUM BROMIDE AND ALBUTEROL SULFATE 3 ML: .5; 3 SOLUTION RESPIRATORY (INHALATION) at 02:15

## 2019-01-01 RX ADMIN — CARVEDILOL 3.12 MG: 3.12 TABLET, FILM COATED ORAL at 08:46

## 2019-01-01 RX ADMIN — CYANOCOBALAMIN TAB 500 MCG 2000 MCG: 500 TAB at 09:37

## 2019-01-01 RX ADMIN — IPRATROPIUM BROMIDE AND ALBUTEROL SULFATE 1 AMPULE: .5; 3 SOLUTION RESPIRATORY (INHALATION) at 19:46

## 2019-01-01 RX ADMIN — IPRATROPIUM BROMIDE AND ALBUTEROL SULFATE 1 AMPULE: .5; 3 SOLUTION RESPIRATORY (INHALATION) at 03:52

## 2019-01-01 RX ADMIN — CARVEDILOL 3.12 MG: 3.12 TABLET, FILM COATED ORAL at 18:18

## 2019-01-01 RX ADMIN — IPRATROPIUM BROMIDE AND ALBUTEROL SULFATE 1 AMPULE: .5; 3 SOLUTION RESPIRATORY (INHALATION) at 11:49

## 2019-01-01 RX ADMIN — LISINOPRIL 20 MG: 20 TABLET ORAL at 10:52

## 2019-01-01 RX ADMIN — INSULIN LISPRO 1 UNITS: 100 INJECTION, SOLUTION INTRAVENOUS; SUBCUTANEOUS at 12:35

## 2019-01-01 RX ADMIN — FINASTERIDE 5 MG: 5 TABLET, FILM COATED ORAL at 09:49

## 2019-01-01 RX ADMIN — Medication 10 ML: at 08:18

## 2019-01-01 RX ADMIN — MICONAZOLE NITRATE: 20 POWDER TOPICAL at 21:11

## 2019-01-01 RX ADMIN — CALCIUM 500 MG: 500 TABLET ORAL at 09:49

## 2019-01-01 RX ADMIN — Medication 10 ML: at 09:00

## 2019-01-01 RX ADMIN — NITROGLYCERIN 0.5 INCH: 20 OINTMENT TOPICAL at 05:49

## 2019-01-01 RX ADMIN — IPRATROPIUM BROMIDE AND ALBUTEROL SULFATE 1 AMPULE: .5; 3 SOLUTION RESPIRATORY (INHALATION) at 00:02

## 2019-01-01 RX ADMIN — Medication 10 ML: at 21:01

## 2019-01-01 RX ADMIN — IPRATROPIUM BROMIDE AND ALBUTEROL SULFATE 1 AMPULE: .5; 3 SOLUTION RESPIRATORY (INHALATION) at 20:15

## 2019-01-01 RX ADMIN — INSULIN LISPRO 1 UNITS: 100 INJECTION, SOLUTION INTRAVENOUS; SUBCUTANEOUS at 19:46

## 2019-01-01 RX ADMIN — MICONAZOLE NITRATE: 20 POWDER TOPICAL at 20:29

## 2019-01-01 RX ADMIN — CARVEDILOL 3.12 MG: 3.12 TABLET, FILM COATED ORAL at 18:12

## 2019-01-01 RX ADMIN — MICONAZOLE NITRATE: 20 POWDER TOPICAL at 08:30

## 2019-01-01 RX ADMIN — Medication 10 ML: at 01:21

## 2019-01-01 RX ADMIN — METOPROLOL TARTRATE 5 MG: 5 INJECTION INTRAVENOUS at 08:23

## 2019-01-01 RX ADMIN — ATORVASTATIN CALCIUM 10 MG: 10 TABLET, FILM COATED ORAL at 10:52

## 2019-01-01 RX ADMIN — CALCIUM 500 MG: 500 TABLET ORAL at 08:32

## 2019-01-01 RX ADMIN — MAGNESIUM SULFATE HEPTAHYDRATE 2 G: 40 INJECTION, SOLUTION INTRAVENOUS at 14:11

## 2019-01-01 RX ADMIN — BENZONATATE 100 MG: 100 CAPSULE ORAL at 03:04

## 2019-01-01 RX ADMIN — CEFTRIAXONE SODIUM 1 G: 1 INJECTION, POWDER, FOR SOLUTION INTRAMUSCULAR; INTRAVENOUS at 19:44

## 2019-01-01 RX ADMIN — CARVEDILOL 3.12 MG: 3.12 TABLET, FILM COATED ORAL at 10:52

## 2019-01-01 RX ADMIN — Medication 10 ML: at 02:11

## 2019-01-01 RX ADMIN — TAMSULOSIN HYDROCHLORIDE 0.4 MG: 0.4 CAPSULE ORAL at 11:28

## 2019-01-01 RX ADMIN — Medication 10 ML: at 22:30

## 2019-01-01 RX ADMIN — IPRATROPIUM BROMIDE AND ALBUTEROL SULFATE 1 AMPULE: .5; 3 SOLUTION RESPIRATORY (INHALATION) at 20:11

## 2019-01-01 RX ADMIN — CARVEDILOL 6.25 MG: 6.25 TABLET, FILM COATED ORAL at 16:41

## 2019-01-01 RX ADMIN — METOPROLOL TARTRATE 5 MG: 5 INJECTION INTRAVENOUS at 02:11

## 2019-01-01 RX ADMIN — SODIUM CHLORIDE 500 ML: 9 INJECTION, SOLUTION INTRAVENOUS at 20:46

## 2019-01-01 RX ADMIN — SERTRALINE HYDROCHLORIDE 50 MG: 50 TABLET ORAL at 11:27

## 2019-01-01 RX ADMIN — INSULIN LISPRO 1 UNITS: 100 INJECTION, SOLUTION INTRAVENOUS; SUBCUTANEOUS at 12:07

## 2019-01-01 RX ADMIN — METOPROLOL TARTRATE 5 MG: 5 INJECTION INTRAVENOUS at 22:30

## 2019-01-01 RX ADMIN — ISODIUM CHLORIDE 3 ML: 0.03 SOLUTION RESPIRATORY (INHALATION) at 04:17

## 2019-01-01 RX ADMIN — IPRATROPIUM BROMIDE AND ALBUTEROL SULFATE 1 AMPULE: .5; 3 SOLUTION RESPIRATORY (INHALATION) at 15:55

## 2019-01-01 RX ADMIN — MORPHINE SULFATE 2 MG: 2 INJECTION, SOLUTION INTRAMUSCULAR; INTRAVENOUS at 11:35

## 2019-01-01 ASSESSMENT — ENCOUNTER SYMPTOMS
ABDOMINAL PAIN: 0
ABDOMINAL PAIN: 0
CHOKING: 0
BACK PAIN: 0
VOICE CHANGE: 0
VOMITING: 0
CONSTIPATION: 0
COUGH: 0
COLOR CHANGE: 0
SHORTNESS OF BREATH: 0
SORE THROAT: 0
CHEST TIGHTNESS: 0
DIARRHEA: 0
EYE ITCHING: 0
EYE PAIN: 0
EYE DISCHARGE: 0
DIARRHEA: 0
CHEST TIGHTNESS: 0
NAUSEA: 0
SHORTNESS OF BREATH: 0
STRIDOR: 0

## 2019-01-01 ASSESSMENT — PAIN SCALES - WONG BAKER: WONGBAKER_NUMERICALRESPONSE: 8

## 2019-01-01 ASSESSMENT — PAIN SCALES - GENERAL
PAINLEVEL_OUTOF10: 0
PAINLEVEL_OUTOF10: 3
PAINLEVEL_OUTOF10: 0

## 2019-01-01 ASSESSMENT — PAIN DESCRIPTION - LOCATION
LOCATION: KNEE
LOCATION: HEAD

## 2019-01-01 ASSESSMENT — PAIN DESCRIPTION - PROGRESSION: CLINICAL_PROGRESSION: GRADUALLY WORSENING

## 2019-01-01 ASSESSMENT — PAIN DESCRIPTION - PAIN TYPE
TYPE: ACUTE PAIN
TYPE: ACUTE PAIN;CHRONIC PAIN

## 2019-01-01 ASSESSMENT — PAIN - FUNCTIONAL ASSESSMENT
PAIN_FUNCTIONAL_ASSESSMENT: PREVENTS OR INTERFERES SOME ACTIVE ACTIVITIES AND ADLS
PAIN_FUNCTIONAL_ASSESSMENT: 0-10

## 2019-01-01 ASSESSMENT — PAIN DESCRIPTION - ONSET: ONSET: ON-GOING

## 2019-01-01 ASSESSMENT — PAIN DESCRIPTION - FREQUENCY: FREQUENCY: INTERMITTENT

## 2019-01-01 ASSESSMENT — PAIN DESCRIPTION - DESCRIPTORS: DESCRIPTORS: DISCOMFORT

## 2019-01-01 ASSESSMENT — PAIN DESCRIPTION - ORIENTATION: ORIENTATION: RIGHT;LEFT

## 2019-04-30 PROBLEM — I50.33 ACUTE ON CHRONIC DIASTOLIC (CONGESTIVE) HEART FAILURE (HCC): Status: ACTIVE | Noted: 2019-01-01

## 2019-04-30 NOTE — PROGRESS NOTES
4 Eyes Skin Assessment     The patient is being assess for  Admission    I agree that 2 RN's have performed a thorough Head to Toe Skin Assessment on the patient. ALL assessment sites listed below have been assessed. Areas assessed by both nurses: Tate Player  [x]   Head, Face, and Ears   [x]   Shoulders, Back, and Chest  [x]   Arms, Elbows, and Hands   [x]   Coccyx, Sacrum, and Ischum  [x]   Legs, Feet, and Heels    Pt has scattered bruising and some small abrasions. Does the Patient have Skin Breakdown?   No         Ilan Prevention initiated:  No   Wound Care Orders initiated:  No      [facility] nurse consulted for Pressure Injury (Stage 3,4, Unstageable, DTI, NWPT, and Complex wounds):  No      Nurse 1 eSignature: Electronically signed by Marianna Fjaardo RN on 4/30/19 at 12:58 PM    **SHARE this note so that the co-signing nurse is able to place an eSignature**    Nurse 2 eSignature: Electronically signed by Noemy Robins RN on 4/30/19 at 6:43 PM

## 2019-04-30 NOTE — DISCHARGE INSTR - COC
Continuity of Care Form    Patient Name: Sofía Mueller   :  1925  MRN:  [id number]    Admit date:  2019  Discharge date:  19    Code Status Order: Limited   Advance Directives:     Admitting Physician:  Marilee Diallo MD  PCP: Kaitlin Blake MD    Discharging Nurse: [facility]/Room#: 0219/0219-01  Discharging Unit Phone Number: [phone]    Emergency Contact:   Extended Emergency Contact Information  Primary Emergency Contact: MELLY Walker [address] Phone: [phone]  Relation: Other    Past Surgical History:  History reviewed. No pertinent surgical history. Immunization History: There is no immunization history on file for this patient.     Active Problems:  Patient Active Problem List   Diagnosis Code    Fever R50.9    Sepsis ([facility] [address] A41.9    Lower abdominal pain R10.30    Urinary tract infection without hematuria N39.0    Other pulmonary embolism without acute cor pulmonale (HCC) I26.99    LONG TERM ANTICOAGULENT USE     SOB (shortness of breath) R06.02    Hypoxia R09.02    Tachycardia R00.0    Encephalopathy G93.40    [place] (hard of hearing) H91.90    DM (diabetes mellitus) ([facility] [address] E11.9    Essential hypertension I10    Hypotension I95.9    Hematuria R31.9    Septic shock (HCC) A41.9, R65.21    Acute respiratory failure with hypoxia (HCC) J96.01    E coli bacteremia R78.81    Leukemoid reaction D72.823    Moderate malnutrition (HCC) E44.0    Acute on chronic diastolic (congestive) heart failure (HCC) I50.33       Isolation/Infection:   Isolation          No Isolation            Nurse Assessment:  Last Vital Signs: BP (!) 148/71   Pulse 71   Temp 97.2 °F (36.2 °C) (Oral)   Resp 18   Ht 5' 10\" (1.778 m)   Wt 145 lb 1 oz (65.8 kg)   SpO2 95%   BMI 20.81 kg/m²     Last documented pain score (0-10 scale):    Last Weight:   Wt Readings from Last 1 Encounters:   19 145 lb 1 oz (65.8 kg)     Mental Status:  oriented, alert and sundown's at night    IV Access:  - None    Nursing Mobility/ADLs:  Walking   Assisted  Transfer  Assisted  Bathing  Assisted  Dressing  Independent  [facility] 22 Delivery   whole    Wound Care Documentation and Therapy:  Pressure Ulcer 10/31/17 Heel Left stage 1 (Active)   Number of days: 546       Pressure Ulcer 10/31/17 Heel Right stage 1 (Active)   Number of days: 546       Pressure Ulcer 10/31/17 Coccyx Mid DTI (Active)   Number of days: 546        Elimination:  Continence:   · Bowel: Yes  · Bladder: Yes  Urinary Catheter: Insertion Date: 5/4/19   Colostomy/Ileostomy/Ileal Conduit: No       Date of Last BM: 4/29/19    Intake/Output Summary (Last 24 hours) at 4/30/2019 1421  Last data filed at 4/30/2019 1255  Gross per 24 hour   Intake 600 ml   Output 300 ml   Net 300 ml     No intake/output data recorded. Safety Concerns:     Sundowners Sundrome, History of Falls (last 30 days) and At Risk for Falls    Impairments/Disabilities:      Vision and Hearing    Nutrition Therapy:  Current Nutrition Therapy:   - Oral Diet:  General    Routes of Feeding: Oral  Liquids: No Restrictions  Daily Fluid Restriction: yes - amount 2000  Last Modified Barium Swallow with Video (Video Swallowing Test): not done    Treatments at the Time of Hospital Discharge:   Respiratory Treatments: yes  Oxygen Therapy:  is not on home oxygen therapy.   Ventilator:    - No ventilator support    Rehab Therapies: Physical Therapy and Occupational Therapy  Weight Bearing Status/Restrictions: No weight bearing restirctions  Other Medical Equipment (for information only, NOT a DME order):  walker  Other Treatments: oxygen PRN    Patient's personal belongings (please select all that are sent with patient):  Glasses, Hearing Aides bilateral, Dentures upper and lower    RN SIGNATURE:  Electronically signed by Valeria Yancey RN on 5/4/19 at 6:52 PM    CASE MANAGEMENT/SOCIAL WORK SECTION    Inpatient Status Date: 04/30/2019    Readmission Risk Assessment Score:  Readmission Risk              Risk of Unplanned Readmission:        11           Discharging to Facility/ Agency   · Name: [facility]  · 21   · Fax: [phone]    / signature: Electronically signed by Ling Bernard RN on 5/4/19 at 2:48 PM    PHYSICIAN SECTION    Prognosis: Fair    Condition at Discharge: Stable    Rehab Potential (if transferring to Rehab): Fair    Recommended Follow-up, Labs or Other Treatments After Discharge: Follow up with urology(Dr. Schwab Prior) as outpatient. He may need turp in future, continue ortez on dc and discuss with urology about when to do void trial    -Follow up with [facility] in 2 weeks as outpt               Physician Certification: I certify the above information and transfer of Alycia Dooley  is necessary for the continuing treatment of the diagnosis listed and that he requires [place] for less 30 days.      Update Admission H&P: No change in H&P    PHYSICIAN SIGNATURE:  Electronically signed by Madhuri Reyes MD on 5/4/19 at 1:35 PM

## 2019-04-30 NOTE — H&P
Hospital Medicine History & Physical      PCP: Obey Werner MD    Date of Admission: 4/30/2019    Date of Service: Pt seen/examined on 4/30/19 and Admitted to Inpatient with expected LOS greater than two midnights due to medical therapy. Chief Complaint:  sob      History Of Present Illness:    80 y.o. male who presented to [facility] with sob. Pt had noted gradual decline of late. He had cystoscopy on 4/29 and had slight hypoxia to 88-89%. Overnight pt had more sob and sats were 82% on room air. He lives at assisted living and POA came over and pt unable to sit up. He also had some wheezing yesterday but denies signif LE swelling. No cp/abd pain. His appetite has been good. No f/chills/n/v/diarrhea of late but did note moist nonproductive weak cough of late. ER course: given lasix    Past Medical History:          Diagnosis Date    Depression     Diabetes mellitus ([facility] [address]     Hyperlipidemia     Hypertension     Osteoporosis     PE (pulmonary thromboembolism) ([facility] [address]     Urinary retention        Past Surgical History:      History reviewed. No pertinent surgical history. Medications Prior to Admission:      Prior to Admission medications    Medication Sig Start Date End Date Taking?  Authorizing Provider   Cetirizine HCl 10 MG CAPS Take by mouth   Yes Historical Provider, MD   vitamin B-12 (CYANOCOBALAMIN) 1000 MCG tablet Take 2,000 mcg by mouth daily   Yes Historical Provider, MD   sertraline (ZOLOFT) 50 MG tablet Take 50 mg by mouth daily   Yes Historical Provider, MD   docusate sodium (COLACE) 100 MG capsule Take 100 mg by mouth 2 times daily   Yes Historical Provider, MD   calcium carbonate (OSCAL) 500 MG TABS tablet Take 500 mg by mouth 2 times daily   Yes Historical Provider, MD   ipratropium-albuterol (DUONEB) 0.5-2.5 (3) MG/3ML SOLN nebulizer solution Inhale 3 mLs into the lungs every 4 hours as needed for Shortness of Breath 11/7/17  Yes Gilford Maxcy, MD   acetaminophen (TYLENOL) 325 MG tablet Take 650 mg by mouth nightly   Yes Historical Provider, MD   alendronate (FOSAMAX) 70 MG tablet Take 70 mg by mouth every 7 days   Yes Historical Provider, MD   atorvastatin (LIPITOR) 10 MG tablet Take 10 mg by mouth daily   Yes Historical Provider, MD   calcium carbonate-vitamin D (CALCIUM 600 + D) 600-400 MG-UNIT TABS per tab Take 1 tablet by mouth 2 times daily   Yes Historical Provider, MD   carvedilol (COREG) 3.125 MG tablet Take 3.125 mg by mouth 2 times daily (with meals)   Yes Historical Provider, MD   finasteride (PROSCAR) 5 MG tablet Take 5 mg by mouth daily   Yes Historical Provider, MD   glipiZIDE (GLUCOTROL XL) 5 MG extended release tablet Take 5 mg by mouth daily   Yes Historical Provider, MD   lisinopril (PRINIVIL;ZESTRIL) 40 MG tablet Take 20 mg by mouth daily    Yes Historical Provider, MD   metFORMIN (GLUCOPHAGE) 500 MG tablet Take 500 mg by mouth 2 times daily (with meals)   Yes Historical Provider, MD   tamsulosin (FLOMAX) 0.4 MG capsule Take 0.4 mg by mouth daily   Yes Historical Provider, MD       Allergies:  Celebrex [celecoxib] and Penicillins    Social History:      The patient currently lives at home    TOBACCO:   reports that he has never smoked. He has never used smokeless tobacco.  ETOH:   has no alcohol history on file. Family History:       Reviewed in detail and negative for DM, CAD, Cancer, CVA. Positive as follows:    History reviewed. No pertinent family history. REVIEW OF SYSTEMS:   Pertinent positives as noted in the HPI. All other systems reviewed and negative. PHYSICAL EXAM PERFORMED:    BP (!) 172/75   Pulse 77   Temp 97.2 °F (36.2 °C) (Oral)   Resp 20   Ht 5' 10\" (1.778 m)   Wt 145 lb 1 oz (65.8 kg)   SpO2 93%   BMI 20.81 kg/m²     General appearance:  No apparent distress, appears stated age and cooperative. Frail appearing  HEENT:  Normal cephalic, atraumatic without obvious deformity. Pupils equal, round, and reactive to light.

## 2019-04-30 NOTE — CARE COORDINATION
CASE MANAGEMENT INITIAL ASSESSMENT      Reviewed chart and met with patient today, re: Possible discharge needs. Explained Case Management role/services. yes    Family present: none  Primary contact information: Friend Valentino Herb [phone], (pt has no family - wife passed away 2-3 years ago)    Admit date/status: 4/30/19 IP  Diagnosis: CHF    Insurance: Medicare  Precert required for SNF - N        3 night stay required - Y    Living arrangements, Adls, care needs, prior to admission: lives in a third floor apartment, elevator available. [facility] assisted living. Independent in ADL's. Recently gave up drivng. takes dinner in the dining yun. Manny Block helps every 2 weeks with med set-up. Transportation:  [facility]. Durable Medical Equipment at home: Walker_X_Cane_X_RTS__ BSC__Shower Chair__  02__ HHN__ CPAP__  BiPap__  Hospital Bed__ W/C__X_ Other__________    Services in the home and/or outpatient, prior to admission: None, however has health aides and 24 hr help center through [email or web address] assisted living. Dialysis Facility (if applicable) N/a  · Name:  · Address:  · Dialysis Schedule:  · Phone:  · Fax:    PT/OT recs: none seen     Hospital Exemption Notification (HEN): needed for snf, not initiated     Barriers to discharge: none    Plan/comments: CM met with pt at bedside for initial assessment. Pt recently moved to [facility]. Pt also recently had a stay at the [facility]. Pt is in agreement for skilled home care if recommended, [employer] goes through [address] [facility].. It pt goes SNF the [facility] is first choice. However at this time pt states that he just wants to go home. Pt is on O2 currently and not on O2 at home. CM following Braulio Hudson RN      ECOC on chart for MD signature

## 2019-04-30 NOTE — PROGRESS NOTES
(36.2 °C) (Oral)   Resp 18   Ht 5' 10\" (1.778 m)   Wt 145 lb 1 oz (65.8 kg)   SpO2 95%   BMI 20.81 kg/m²   SPO2 (COPD values may differ): 90-91% on room air or greater than 92% on FiO2 24- 28% = 1    [place] (asthma only): not applicable = 0    RSI: 7-8 = BID and Q4HPRN (every four hours as needed) for dyspnea        Plan       Goals: medication delivery, mobilize retained secretions, volume expansion and improve oxygenation    Patient/caregiver was educated on the proper method of use for Respiratory Care Devices:  Yes      Level of patient/caregiver understanding able to:   ? Verbalize understanding   ? Demonstrate understanding       ? Teach back        ? Needs reinforcement       ? No available caregiver               ? Other:     Response to education:  Excellent     Is patient being placed on Home Treatment Regimen? No     Does the patient have everything they need prior to discharge? NA     Comments: REVIEWED PT CHART     Plan of Care: change to duoneb q4 while awake and prn for wz    Electronically signed by Karmen Sprague RCP on 4/30/2019 at 11:52 AM    Respiratory Protocol Guidelines     1. Assessment and treatment by Respiratory Therapy will be initiated for medication and therapeutic interventions upon initiation of aerosolized medication. 2. Physician will be contacted for respiratory rate (RR) greater than 35 breaths per minute. Therapy will be held for heart rate (HR) greater than 140 beats per minute, pending direction from physician. 3. Bronchodilators will be administered via Metered Dose Inhaler (MDI) with spacer when the following criteria are met:  a. Alert and cooperative     b. HR < 140 bpm  c. RR < 30 bpm                d. Can demonstrate a 2-3 second inspiratory hold  4. Bronchodilators will be administered via Hand Held Nebulizer [facility]) to patients when ANY of the following criteria are met  a. Incognizant or uncooperative          b.  Patients treated with HHN at Home c. Unable to demonstrate proper use of MDI with spacer     d. RR > 30 bpm   5. Bronchodilators will be delivered via Metered Dose Inhaler (MDI), HHN, Aerogen to intubated patients on mechanical ventilation. 6. Inhalation medication orders will be delivered and/or substituted as outlined below. Aerosolized Medications Ordering and Administration Guidelines:    1. All Medications will be ordered by a physician, and their frequency and/or modality will be adjusted as defined by the patients Respiratory Severity Index (RSI) score. 2. If the patient does not have documented COPD, consider discontinuing anticholinergics when RSI is less than 9.  3. If the bronchospasm worsens (increased RSI), then the bronchodilator frequency can be increased to a maximum of every 4 hours. If greater than every 4 hours is required, the physician will be contacted. 4. If the bronchospasm improves, the frequency of the bronchodilator can be decreased, based on the patient's RSI, but not less than home treatment regimen frequency. 5. Bronchodilator(s) will be discontinued if patient has a RSI less than 9 and has received no scheduled or as needed treatment for 72  Hrs. Patients Ordered on a Mucolytic Agent:    1. Must always be administered with a bronchodilator. 2. Discontinue if patient experiences worsened bronchospasm, or secretions have lessened to the point that the patient is able to clear them with a cough. Anti-inflammatory and Combination Medications:    1. If the patient lacks prior history of lung disease, is not using inhaled anti-inflammatory medication at home, and lacks wheezing by examination or by history for at least 24 hours, contact physician for possible discontinuation.

## 2019-04-30 NOTE — CONSULTS
[facility]  [phone]      Attending Physician: An hCen MD  Reason for Consultation/Chief Complaint: sob    Subjective   History of Present Illness:  Lucía Carr is a 80 y.o. patient who presented to the hospital with complaints of sob over last few days. He recently has moved into [facility] as he has friends that [place] on this side of town\". He states he doesn't have family so friends suggested he move and he has recently given up driving. He presented w/ sob and was found in ER to have negative troponin but elevated pro bnp 3026. He was felt to have HF and is being given iv lasix. He chronically has stable htn, hchol, dm on prescribed meds and in last 1mo he has had f/u w/ urology d/t hematuria. He says he had a catheter placed and has had scopes in last few months. He has been treated with antibiotics and that helped with hematuria. Past Medical History:   has a past medical history of Depression, Diabetes mellitus ([facility] [address] Hyperlipidemia, Hypertension, Osteoporosis, PE (pulmonary thromboembolism) ([facility] [address] and Urinary retention. Social History:  + prior smoking hx    Family History:  Parents had cad      Home Medications:  Were reviewed and are listed in nursing record and/or below  Prior to Admission medications    Medication Sig Start Date End Date Taking?  Authorizing Provider   Cetirizine HCl 10 MG CAPS Take by mouth   Yes Historical Provider, MD   vitamin B-12 (CYANOCOBALAMIN) 1000 MCG tablet Take 2,000 mcg by mouth daily   Yes Historical Provider, MD   sertraline (ZOLOFT) 50 MG tablet Take 50 mg by mouth daily   Yes Historical Provider, MD   docusate sodium (COLACE) 100 MG capsule Take 100 mg by mouth 2 times daily   Yes Historical Provider, MD   calcium carbonate (OSCAL) 500 MG TABS tablet Take 500 mg by mouth 2 times daily   Yes Historical Provider, MD   ipratropium-albuterol (DUONEB) 0.5-2.5 (3) MG/3ML SOLN nebulizer solution Inhale 3 mLs into the lungs every 4 hours as needed for Shortness of Breath 11/7/17  Yes Shahida Espinoza MD   acetaminophen (TYLENOL) 325 MG tablet Take 650 mg by mouth nightly   Yes Historical Provider, MD   alendronate (FOSAMAX) 70 MG tablet Take 70 mg by mouth every 7 days   Yes Historical Provider, MD   atorvastatin (LIPITOR) 10 MG tablet Take 10 mg by mouth daily   Yes Historical Provider, MD   calcium carbonate-vitamin D (CALCIUM 600 + D) 600-400 MG-UNIT TABS per tab Take 1 tablet by mouth 2 times daily   Yes Historical Provider, MD   carvedilol (COREG) 3.125 MG tablet Take 3.125 mg by mouth 2 times daily (with meals)   Yes Historical Provider, MD   finasteride (PROSCAR) 5 MG tablet Take 5 mg by mouth daily   Yes Historical Provider, MD   glipiZIDE (GLUCOTROL XL) 5 MG extended release tablet Take 5 mg by mouth daily   Yes Historical Provider, MD   lisinopril (PRINIVIL;ZESTRIL) 40 MG tablet Take 20 mg by mouth daily    Yes Historical Provider, MD   metFORMIN (GLUCOPHAGE) 500 MG tablet Take 500 mg by mouth 2 times daily (with meals)   Yes Historical Provider, MD   tamsulosin (FLOMAX) 0.4 MG capsule Take 0.4 mg by mouth daily   Yes Historical Provider, MD        CURRENT Medications:    atorvastatin (LIPITOR) tablet 10 mg Daily   calcium elemental (OSCAL) tablet 500 mg BID   carvedilol (COREG) tablet 3.125 mg BID WC   finasteride (PROSCAR) tablet 5 mg Daily   ipratropium-albuterol (DUONEB) nebulizer solution 3 mL Q4H PRN   lidocaine 4 % external patch 1 patch Daily   sertraline (ZOLOFT) tablet 50 mg Daily   tamsulosin (FLOMAX) capsule 0.4 mg Daily   sodium chloride flush 0.9 % injection 10 mL 2 times per day   sodium chloride flush 0.9 % injection 10 mL PRN   ondansetron (ZOFRAN) injection 4 mg Q6H PRN   [START ON 5/1/2019] enoxaparin (LOVENOX) injection 40 mg Daily   glucose (GLUTOSE) 40 % oral gel 15 g PRN   dextrose 50 % solution 12.5 g PRN   glucagon (rDNA) injection 1 mg PRN   dextrose 5 % solution PRN   senna (SENOKOT) tablet 8.6 mg Daily PRN   insulin lispro (HUMALOG) injection vial 0-6 Units TID WC   insulin lispro (HUMALOG) injection vial 0-3 Units Nightly   furosemide (LASIX) injection 40 mg BID   perflutren lipid microspheres (DEFINITY) injection 1.65 mg ONCE PRN   ipratropium-albuterol (DUONEB) nebulizer solution 1 ampule Q4H WA       Allergies:  Celebrex [celecoxib] and Penicillins     Review of Systems:   A 14 point review of symptoms completed. Pertinent positives identified in the HPI, all other review of symptoms negative as below.       Objective   PHYSICAL EXAM:    Vitals:    04/30/19 1150   BP:    Pulse:    Resp: 18   Temp:    SpO2: 95%    Weight: 145 lb 1 oz (65.8 kg)         General Appearance:  Alert, cooperative, no distress, appears stated age   Head:  Normocephalic, without obvious abnormality, atraumatic, + alopecia, + hearing loss   Eyes:  PERRL, conjunctiva/corneas clear   Nose: Nares normal, no drainage or sinus tenderness   Throat: Lips, mucosa, and tongue normal   Neck: Supple, diff ausc for carotids/jvp d/t wheezing   Lungs:   bilat wheeze and decrs bs, respirations mildly labored   Chest Wall:  No deformity or tenderness   Heart:  Regular rate and rhythm, S1, S2 normal, no murmur, rub or gallop   Abdomen:   Soft, non-tender, bowel sounds active all four quadrants,  no masses, no organomegaly   Extremities: Extremities normal, atraumatic, no cyanosis or edema   Pulses: 2+ and symmetric   Skin: Skin color, texture, turgor normal, no rashes or lesions   Pysch: Normal mood and affect   Neurologic: Normal gross motor and sensory exam.         Labs   CBC: Lab Results   Component Value Date    WBC 11.2 04/30/2019    RBC 6.39 04/30/2019    HGB 16.0 04/30/2019    HCT 50.0 04/30/2019    MCV 78.3 04/30/2019    RDW 18.1 04/30/2019     04/30/2019     CMP:  Lab Results   Component Value Date     04/30/2019    K 4.1 04/30/2019    CL 97 04/30/2019    CO2 28 04/30/2019    BUN 13 04/30/2019 CREATININE 0.7 04/30/2019    GFRAA >60 04/30/2019    AGRATIO 1.3 04/30/2019    LABGLOM >60 04/30/2019    GLUCOSE 187 04/30/2019    PROT 6.1 04/30/2019    CALCIUM 8.7 04/30/2019    BILITOT 0.6 04/30/2019    ALKPHOS 75 04/30/2019    AST 14 04/30/2019    ALT 9 04/30/2019     PT/INR:  No results found for: PTINR  HgBA1c:No results found for: LABA1C  Lab Results   Component Value Date    TROPONINI <0.01 04/30/2019         Cardiac Data     Last EKG:     nsr lt axis, pvcs similar to prev    Echo:    2017    Conclusions      Summary   Left ventricular systolic function is normal with the ejection fraction   estimated at 55%.   No regional wall motion abnormalities.   Grade II diastolic dysfunction with elevated filling pressure.   Moderately dilated left atrium.   Right ventricle is mildly enlarged.   Right atrium is mildly dilated.   Mild aortic stenosis.   Systolic pulmonary artery pressure (SPAP) is normal and estimated at 39 mmHg   (RA pressure 8 mmHg).        Stress Test:    Cath:    Studies:     Cxr:    Impression   Suspected underlying pulmonary edema given the vascular indistinctness           I have reviewed labs and imaging/xray/diagnostic testing in this note.     Assessment and Plan            Patient Active Problem List   Diagnosis    Fever    Sepsis ([address]    Lower abdominal pain    Urinary tract infection without hematuria    Other pulmonary embolism without acute cor pulmonale (HCC)    LONG TERM ANTICOAGULENT USE    SOB (shortness of breath)    Hypoxia    Tachycardia    Encephalopathy    [place] (hard of hearing)    DM (diabetes mellitus) ([address]    Essential hypertension    Hypotension    Hematuria    Septic shock (HCC)    Acute respiratory failure with hypoxia (HCC)    E coli bacteremia    Leukemoid reaction    Moderate malnutrition (HCC)    Acute on chronic diastolic (congestive) heart failure (HCC)       Acute hf, unknown type, probably diast, continue iv diuresis    Htn, bblocker    Hchol, statin        Thank you for allowing us to participate in the care of [employer]. Please call me with any questions [phone].     Jt Beckford MD, [facility]   Interventional Cardiologist  [place] [address]  [phone] [facility]  [phone] [address] [place] [address]  4/30/2019 12:46 PM

## 2019-05-01 NOTE — CONSULTS
[address] FAILURE PROGRAM      Kristian Mckay 8/2/1925    History:  Past Medical History:   Diagnosis Date    Depression     Diabetes mellitus ([facility]     Hyperlipidemia     Hypertension     Osteoporosis     PE (pulmonary thromboembolism) ([facility] [facility]     Urinary retention        ECHO:50%    Discharge plans: [facility]    Family Present: none    Advanced Directives: patient has advance directives scanned in the chart    Patient's stated goal of care: reduce sodium intake and fluid intake      Patient's current functional capacity:  Marked limitation of physical activity. Comfortable at rest. Less than ordinary activity causes fatigue, palpitation, or dyspnea. Pt sitting in bed at this time on 2 L O2. Pt with complaints of shortness of breath. Pt with nonpitting lower extremity edema. Patient's weights and intake/output reviewed:         Last three weights hospital weights reviewed:    Patient Vitals for the past 96 hrs (Last 3 readings):   Weight   05/01/19 0616 157 lb 6.5 oz (71.4 kg)   04/30/19 0915 145 lb 1 oz (65.8 kg)   04/30/19 0425 165 lb (74.8 kg)       Patient provided with both written and verbal education on CHF signs/symptoms, causes, discharge medications, daily weights, low sodium diet, activity, and follow-up. HF zone self management written instructions provided/reviewed and advised to call MD when in \"yellow\" zone. Instructed them to call the doctor post discharge if they experiences increasing worsening shortness of breath, worsening chest pains, increased swelling from their baseline, worsening cough, or weight gain of >2- 3 lbs in a day/ 5 lb gain in a week. Also advised to call the doctor if they feels dizzy, increased fatigue, decreased or difficulty urinating. Instructed on and emphasized importance of scheduled hospital follow-up appointment with Sabine Beavers MD on 5-7-19 at [facility] education needs reinforcement.  No additional questions at this time. Stated he will alert nurse with any questions. PATIENT/CAREGIVER TEACHING:    Level of patient/caregiver understanding able to:   [ X ] Verbalize understanding [ ] Demonstrate understanding [ ] Teach back   [ X ] Needs reinforcement [ ] Other:     Education Time: 25 minutes  Recommendations:   1. Encourage follow-up appointment compliance. Next appointment: 5-7-19  2. Educate further on fluid restriction of <64oz during inpatient stay so they can understand how to measure intake at home. 3. Review sodium restrictions. Encouraged to not add table salt to their foods and avoid foods that are high in sodium. 4. Continue to educate on S/S. Stress the importance of calling the MD with the earliest signs of an acute exacerbation. 5. Emphasize daily weights - instructed to call the MD if the patient gains 3 lb in a day or 5 lb in a week. 6. Provided patient with CHF Resource Line for questions and concerns.      Claire Mclain HF BSN-RN  Heart Failure Navigator  [address] [place] [address]  [phone]

## 2019-05-01 NOTE — PROGRESS NOTES
rubs or gallops. Abdomen: Soft, non-tender, non-distended with normal bowel sounds. Musculoskeletal:  No clubbing, cyanosis or edema bilaterally. Full range of motion without deformity. Skin: Skin color, texture, turgor normal.  No rashes or lesions. Neurologic:  Neurovascularly intact without any focal sensory/motor deficits. Cranial nerves: II-XII intact, grossly non-focal.  Psychiatric:  Alert and oriented, thought content appropriate, normal insight  Capillary Refill: Brisk,< 3 seconds   Peripheral Pulses: +2 palpable, equal bilaterally              Labs:   Recent Labs     04/30/19  0501   WBC 11.2*   HGB 16.0   HCT 50.0        Recent Labs     04/30/19  0501 05/01/19  0649   * 138   K 4.1 3.7   CL 97* 97*   CO2 28 32   BUN 13 10   CREATININE 0.7* 0.7*   CALCIUM 8.7 8.6     Recent Labs     04/30/19  0501   AST 14*   ALT 9*   BILITOT 0.6   ALKPHOS 75     No results for input(s): INR in the last 72 hours.   Recent Labs     04/30/19  0501   TROPONINI <0.01       Urinalysis:      Lab Results   Component Value Date    NITRU Negative 10/31/2017    WBCUA  10/31/2017    BACTERIA 2+ 10/31/2017    RBCUA 3-5 10/31/2017    BLOODU LARGE 10/31/2017    SPECGRAV 1.010 10/31/2017    GLUCOSEU Negative 10/31/2017       Radiology:  XR CHEST PORTABLE   Final Result   Suspected underlying pulmonary edema given the vascular indistinctness                 Assessment/Plan:    Active Hospital Problems    Diagnosis Date Noted    Acute on chronic diastolic (congestive) heart failure (HCC) [I50.33] 04/30/2019     Acute resp failure- suspect due to new acute CHF(no prior hx of this although pt is on home lasix), not on oxygen at home  -tele  -echo done( ef 55%, g1 dd, moderate AS)  Cards consulted, apprec recs  -Iv lasix ordered  -daily wts, I/os monitored  -wean oxygen as tolerated  -duonebs q4 wa     HTN- continued coreg  -held acei     HLD- on statin     DM2- controlled, a1c 6.8  -ac/hs bs  -Low ssi  -held glipizide, metformin     BPH- on flomax, proscar           DVT Prophylaxis: on lovenox  Diet: DIET GENERAL;  Code Status: Limited    PT/OT Eval Status: ordered 5/1    Dispo - pending further cards recs, check labs/cxr today    Teri Monteiro MD

## 2019-05-01 NOTE — PROGRESS NOTES
Bedside report given to [facility]. Call light and bedside table within reach. No needs voiced at this time. Bed in lowest position, brakes locked. Bed alarm on and in place, AVASYS in place.

## 2019-05-01 NOTE — PROGRESS NOTES
Occupational Therapy   Occupational Therapy Initial Assessment  Date: 2019   Patient Name: Karol Jacinto  MRN: [id number]     : 1925    Date of Service: 2019    Discharge Recommendations:  Subacute/Skilled Nursing Facility       Assessment   Performance deficits / Impairments: Decreased functional mobility ; Decreased ADL status; Decreased safe awareness;Decreased endurance;Decreased strength;Decreased balance;Decreased high-level IADLs  Patient Education: role of OT, safety  REQUIRES OT FOLLOW UP: Yes  Activity Tolerance  Activity Tolerance: Patient limited by fatigue  Safety Devices  Safety Devices in place: Yes  Type of devices: Call light within reach; Chair alarm in place; Left in chair;Nurse notified           Patient Diagnosis(es): The primary encounter diagnosis was New onset of congestive heart failure ([facility] [address] A diagnosis of Shortness of breath was also pertinent to this visit. has a past medical history of Depression, Diabetes mellitus ([facility] [address] Hyperlipidemia, Hypertension, Osteoporosis, PE (pulmonary thromboembolism) ([facility] [address] and Urinary retention. has no past surgical history on file.            Restrictions  Restrictions/Precautions  Restrictions/Precautions: General Precautions, Fall Risk  Position Activity Restriction  Other position/activity restrictions: High fall risk, 2 L O 2, telemetry    Subjective   General  Chart Reviewed: Yes  Patient assessed for rehabilitation services?: Yes  Family / Caregiver Present: No  Referring Practitioner: Dr. Claudene Gala  Diagnosis: CHF excerbation  General Comment  Comments: RN cleared pt for OT eval; pt resting in bed, minimally agreeable to get up OOB to chair     Social/Functional History  Social/Functional History  Lives With: Alone  Type of Home: Facility([facility])  Home Layout: One level  Home Access: Elevator  Bathroom Shower/Tub: Shower chair with back, Walk-in shower  Bathroom Toilet: Handicap height  Bathroom Equipment: Grab bars in shower, Grab bars around toilet  Home Equipment: [address], Rolling walker  Receives Help From: Personal care attendant(2x/week for showers)  ADL Assistance: Needs assistance  Bath: Moderate assistance  Ambulation Assistance: Independent  Transfer Assistance: Independent(except shower transfers)       Objective   Vision: Impaired  Vision Exceptions: Wears glasses at all times  Hearing: Within functional limits    Orientation  Overall Orientation Status: Within Functional Limits     Balance  Sitting Balance: Supervision  Standing Balance: Minimal assistance(with RW)  Standing Balance  Activity: bed-->chair  Toilet Transfers  Toilet Transfer: Unable to assess(refused use of bathroom )  ADL  Grooming: Setup(seated in chair to wash face & hands)  LE Dressing: Minimal assistance(seated to doff/igor sock, lean to Left)  Toileting: Setup(with urinal; refused to walk into bathroom )    RUE Tone: Normotonic  LUE Tone: Normotonic     Bed mobility  Supine to Sit: Minimal assistance  Sit to Supine: Unable to assess(Left up in chair upon exiting)  Transfers  Sit to stand: Contact guard assistance  Stand to sit: Contact guard assistance     Vision - Basic Assessment  Prior Vision: Wears glasses all the time    Cognition  Overall Cognitive Status: Exceptions(alert, but minimally cooperative)  Arousal/Alertness: Appropriate responses to stimuli  Following Commands:  Follows one step commands with increased time  Attention Span: Appears intact  Memory: Decreased recall of precautions  Safety Judgement: Decreased awareness of need for assistance;Decreased awareness of need for safety  Insights: Decreased awareness of deficits  Initiation: Requires cues for some  Sequencing: Does not require cues    LUE AROM : WFL  RUE AROM : WFL          Plan   Plan  Times per week: 3-5x/ week   Current Treatment Recommendations: ROM, Balance Training, Functional Mobility Training, Safety Education & Training, Endurance

## 2019-05-01 NOTE — PROGRESS NOTES
Physical Therapy    Facility/Department: [facility] A[facility] CARD TELEMETRY  Initial Assessment    NAME: Juan Ramon Best  : 1925  MRN: [id number]    Date of Service: 2019    Discharge Recommendations:  Subacute/Skilled Nursing Facility   PT Equipment Recommendations  Equipment Needed: No    Assessment   Body structures, Functions, Activity limitations: Decreased functional mobility ; Decreased endurance;Decreased strength;Decreased safe awareness;Decreased balance  Assessment: Pt is 79 yo male who presents with diagnosis of acute on chronic diastolic heart failure. Pt min-CGA for mobility with RW this date. Pt declined to ambulate in yun due to fatigue. Pt would benefit from continued skilled therapy to address mentioned deficits. Recommend SNF at d/c due to pt functioning below baseline and pt lives alone. Treatment Diagnosis: decreased (I) with mobility  Specific instructions for Next Treatment: progress mobility as tolerated  Prognosis: Good  Decision Making: Low Complexity  Patient Education: Role of PT, safety with mobility, POC, d/c recs; pt verbalized understanding  Barriers to Learning: none  REQUIRES PT FOLLOW UP: Yes  Activity Tolerance  Activity Tolerance: Patient Tolerated treatment well;Patient limited by fatigue;Patient limited by endurance       Patient Diagnosis(es): The primary encounter diagnosis was New onset of congestive heart failure ([address] A diagnosis of Shortness of breath was also pertinent to this visit. has a past medical history of Depression, Diabetes mellitus ([address] Hyperlipidemia, Hypertension, Osteoporosis, PE (pulmonary thromboembolism) ([address] and Urinary retention. has no past surgical history on file.     Restrictions  Restrictions/Precautions  Restrictions/Precautions: General Precautions, Fall Risk  Position Activity Restriction  Other position/activity restrictions: High fall risk, 2 L O 2, telemetry  Vision/Hearing  Vision: Impaired  Vision Exceptions: Wears glasses at all times  Hearing: Within functional limits     Subjective  General  Chart Reviewed: Yes  Patient assessed for rehabilitation services?: Yes  Response To Previous Treatment: Not applicable  Family / Caregiver Present: No  Referring Practitioner: Dr. Crow Mcmahon MD  Referral Date : 05/01/19  Diagnosis: Acute on chronic diastolic heart failure  Follows Commands: Within Functional Limits  General Comment  Comments: Pt resting in bed on approach; RN cleared pt for therapy  Subjective  Subjective: pt agreeable to therapy  Pain Screening  Patient Currently in Pain: Denies       Orientation  Orientation  Overall Orientation Status: Within Functional Limits  Social/Functional History  Social/Functional History  Lives With: Alone  Type of Home: Facility([facility] )  Home Layout: One level  Home Access: Elevator  Bathroom Shower/Tub: Shower chair with back, Walk-in shower  Bathroom Toilet: Handicap height  Bathroom Equipment: Grab bars in shower, Grab bars around toilet  Home Equipment: [employer], Rolling walker  Receives Help From: Personal care attendant(2x/week for showers)  ADL Assistance: Needs assistance  Bath: Moderate assistance  Ambulation Assistance: Independent  Transfer Assistance: Independent(except shower transfers)    Objective     RLE AROM: WFL    LLE AROM : WFL  Strength RLE  Comment: Grossly 4/5 throughout  Strength LLE  Comment: Grossly 4/5 throughout        Bed mobility  Supine to Sit: Minimal assistance(HOB elevated, use of rails, increased time)  Sit to Supine: Unable to assess(pt up in chair at end of session)     Transfers  Sit to Stand: Contact guard assistance(from EOB to RW. Cues for hand placement)  Stand to sit: Contact guard assistance     Ambulation  Ambulation?: Yes  Ambulation 1  Surface: level tile  Device: Rolling Walker  Assistance: Contact guard assistance  Quality of Gait: Demonstrates decreased jada and bilateral step length.  Cues to maintain GLENDA within RW  Distance: 3 ft  Comments: Pt declined further ambulation     Balance  Sitting - Static: Good;-  Sitting - Dynamic: Fair  Standing - Static: Fair;-  Standing - Dynamic: Poor     Exercises  Hip Abduction: Seated clamshells x 10 BLE  Knee Long Arc Quad: x 10 BLE  Ankle Pumps: x 10 BLE     Plan   Plan  Times per week: 3-5x/wk  Times per day: Daily  Specific instructions for Next Treatment: progress mobility as tolerated  Current Treatment Recommendations: Strengthening, Gait Training, Patient/Caregiver Education & Training, Balance Training, Neuromuscular Re-education, Functional Mobility Training, Endurance Training, Transfer Training, Safety Education & Training  Safety Devices  Type of devices: All fall risk precautions in place, Call light within reach, Chair alarm in place, Gait belt, Patient at risk for falls, Nurse notified, Left in chair           AM-PAC Score     AM-PAC Inpatient Mobility without Stair Climbing Raw Score : 15  AM-PAC Inpatient without Stair Climbing T-Scale Score : 43.03  Mobility Inpatient CMS 0-100% Score: 47.43  Mobility Inpatient without Stair CMS G-Code Modifier : CK       Goals  Short term goals  Time Frame for Short term goals: 1 week (5/8) unless otherwise specified  Short term goal 1: Pt will be supervision with bed mobility. Short term goal 2: Pt will be SBA for transfers with RW. Short term goal 3: Pt will be SBA for ambulation 100 ft with RW. Short term goal 4: 5/3: Pt will participate in 12-15 reps of BLE exercises to promote strength and activity tolerance.   Patient Goals   Patient goals : \"to go home\"       Therapy Time   Individual Concurrent Group Co-treatment   Time In 1310         Time Out 1330         Minutes 20         Timed Code Treatment Minutes: [address] [facility] [address], [address] [facility]

## 2019-05-01 NOTE — PROGRESS NOTES
[facility]   Daily Cardiovascular Progress Note    Admit Date: 4/30/2019    Chief complaint:  sob  HPI:     Pt presented with sob, has been tx w/ IV diuretics for HF, he feels better.        Medications/Labs all Reviewed:  Patient Active Problem List   Diagnosis    Fever    Sepsis ([facility] [address]    Lower abdominal pain    Urinary tract infection without hematuria    Other pulmonary embolism without acute cor pulmonale (HCC)    LONG TERM ANTICOAGULENT USE    SOB (shortness of breath)    Hypoxia    Tachycardia    Encephalopathy    [place] ([place] hearing[place]    DM (diabetes mellitus) ([facility] [address]    Essential hypertension    Hypotension    Hematuria    Septic shock (HCC)    Acute respiratory failure with hypoxia (HCC)    E coli bacteremia    Leukemoid reaction    Moderate malnutrition (HCC)    Acute on chronic diastolic (congestive) heart failure (HCC)       Medications:    benzonatate (TESSALON) capsule 100 mg TID PRN   atorvastatin (LIPITOR) tablet 10 mg Daily   calcium elemental (OSCAL) tablet 500 mg BID   carvedilol (COREG) tablet 3.125 mg BID WC   finasteride (PROSCAR) tablet 5 mg Daily   ipratropium-albuterol (DUONEB) nebulizer solution 3 mL Q4H PRN   lidocaine 4 % external patch 1 patch Daily   sertraline (ZOLOFT) tablet 50 mg Daily   tamsulosin (FLOMAX) capsule 0.4 mg Daily   sodium chloride flush 0.9 % injection 10 mL 2 times per day   sodium chloride flush 0.9 % injection 10 mL PRN   ondansetron (ZOFRAN) injection 4 mg Q6H PRN   enoxaparin (LOVENOX) injection 40 mg Daily   glucose (GLUTOSE) 40 % oral gel 15 g PRN   dextrose 50 % solution 12.5 g PRN   glucagon (rDNA) injection 1 mg PRN   dextrose 5 % solution PRN   senna (SENOKOT) tablet 8.6 mg Daily PRN   insulin lispro (HUMALOG) injection vial 0-6 Units TID WC   insulin lispro (HUMALOG) injection vial 0-3 Units Nightly   furosemide (LASIX) injection 40 mg BID   perflutren lipid microspheres (DEFINITY) injection 1.65 mg ONCE PRN ipratropium-albuterol (DUONEB) nebulizer solution 1 ampule Q4H WA          PHYSICAL EXAM   /68   Pulse 75   Temp 97.9 °F (36.6 °C) (Oral)   Resp 16   Ht 5' 10\" (1.778 m)   Wt 157 lb 6.5 oz (71.4 kg)   SpO2 90%   BMI 22.59 kg/m²    Vitals:    05/01/19 0904 05/01/19 1105 05/01/19 1508 05/01/19 1637   BP:  104/61 123/62 124/68   Pulse:  90 70 75   Resp:  16 16 16   Temp:  98.6 °F (37 °C) 98.1 °F (36.7 °C) 97.9 °F (36.6 °C)   TempSrc:  Oral Oral Oral   SpO2: 95% 91% 94% 90%   Weight:       Height:             Intake/Output Summary (Last 24 hours) at 5/1/2019 1641  Last data filed at 5/1/2019 1407  Gross per 24 hour   Intake 780 ml   Output 400 ml   Net 380 ml     Wt Readings from Last 3 Encounters:   05/01/19 157 lb 6.5 oz (71.4 kg)   11/07/17 175 lb 11.3 oz (79.7 kg)         Gen: Patient in NAD, resting comfortably  Neck: elev jvp  Respiratory: decrs bs  Chest: normal without deformity  Cardiovascular:RRR, S1S2, 2/6 sm  Abdomen: Soft, NTND, Normal BS  Extremities: No clubbing, cyanosis, or edema  Neurological/Psychiatric: AxO x4, No gross motors/sensory deficits  Skin:  Warm and dry      Labs:  CBC: Recent Labs     04/30/19  0501   WBC 11.2*   HGB 16.0   HCT 50.0   MCV 78.3*        BMP: Recent Labs     04/30/19  0501 05/01/19  0649   * 138   K 4.1 3.7   CL 97* 97*   CO2 28 32   BUN 13 10   CREATININE 0.7* 0.7*     MG:    Recent Labs     05/01/19  0649   MG 1.50*      PT/INR: No results for input(s): PROTIME, INR in the last 72 hours. APTT: No results for input(s): APTT in the last 72 hours. Cardiac Enzymes: Recent Labs     04/30/19  0501   TROPONINI <0.01       Cardiac Studies:      Echo:    Conclusions      Summary   Technically difficult examination due to patient immobility.  No IV access   was available for contrast.   Normal left ventricular systolic function with a visually estimated ejection   fraction of 55%.   No regional wall motion abnormalities are seen.   Grade I diastolic

## 2019-05-01 NOTE — PROGRESS NOTES
Paged Rolando Waller MD: \"FYI pt's Mag 1.50 this morning. Also, pt is requesting something for cough. Will look for orders thank you. \"    Awaiting orders.

## 2019-05-01 NOTE — PROGRESS NOTES
Paged Sakshi Armas MD: \" Pt's family requesting urology consult due to recent cystoscopy and stent removal. Thank you. \"    Awaiting order.

## 2019-05-02 NOTE — PROGRESS NOTES
End of shift report given to Faustina Michael bedside. Patient alert and oriented. Bed in lowest position with wheels locked. Call light within reach.  No further needs at this time. [facility]

## 2019-05-02 NOTE — PROGRESS NOTES
[facility]   Daily Progress Note    Admit Date:  4/30/2019  HPI:    Chief Complaint   Patient presents with    Shortness of Breath     from assisted living, states SOB began 1-2hrs PTA        Interval history:   Burke Hargrove is being followed for heart failure    Subjective:  Mr. Rosey Escobar complaint is the cough, not able to cough anything up. Breathing seems to be improving. No chest pain. Objective:   BP (!) 177/65   Pulse 63   Temp 96.8 °F (36 °C) (Oral)   Resp 20   Ht 5' 10\" (1.778 m)   Wt 160 lb 4.4 oz (72.7 kg)   SpO2 95%   BMI 23.00 kg/m²       Intake/Output Summary (Last 24 hours) at 5/2/2019 1035  Last data filed at 5/2/2019 0953  Gross per 24 hour   Intake 848 ml   Output 675 ml   Net 173 ml       NYHA: IV    Physical Exam:  General:  Awake, alert, NAD  Skin:  Warm and dry  Neck:  JVD elevated  Chest:  Dim to auscultation, no wheezes/ ++ rhonchi/rales  Cardiovascular:  RRR S1S2, + murmur, no r/g  Abdomen:  Soft, nontender, +bowel sounds  Extremities:  No BLE edema    Medications:    atorvastatin  10 mg Oral Daily    calcium elemental  500 mg Oral BID    carvedilol  3.125 mg Oral BID WC    finasteride  5 mg Oral Daily    lidocaine  1 patch Transdermal Daily    sertraline  50 mg Oral Daily    tamsulosin  0.4 mg Oral Daily    sodium chloride flush  10 mL Intravenous 2 times per day    enoxaparin  40 mg Subcutaneous Daily    insulin lispro  0-6 Units Subcutaneous TID WC    insulin lispro  0-3 Units Subcutaneous Nightly    furosemide  40 mg Intravenous BID    ipratropium-albuterol  1 ampule Inhalation Q4H WA      dextrose         Lab Data:  CBC:   Recent Labs     04/30/19  0501   WBC 11.2*   HGB 16.0        BMP:    Recent Labs     04/30/19  0501 05/01/19  0649 05/02/19  0716   * 138 139   K 4.1 3.7 3.4*   CO2 28 32 35*   BUN 13 10 19   CREATININE 0.7* 0.7* 0.7*     INR:  No results for input(s): INR in the last 72 hours.   BNP:    Recent Labs 04/30/19  0501 05/02/19  0716   PROBNP 3,026* 1,500*     No results found for: LVEF, LVEFMODE    Echo 5/1/19   Summary   Technically difficult examination due to patient immobility. No IV access   was available for contrast.   Normal left ventricular systolic function with a visually estimated ejection   fraction of 55%. No regional wall motion abnormalities are seen. Grade I diastolic dysfunction with normal LV filling pressures. The right ventricle is not well visualized but appears enlarged   Mild right atrial enlargement. Compared to the last echo on 11/01/2017 (2.45 m/sec), aortic valve   velocities appear to have decreased. Findings are consistent with moderate AS. Frequent ventricular ectopy during exam.     weights   05/02/19 0640  160 lb 4.4 oz (72.7 kg)  Bed scale     05/01/19 0616  157 lb 6.5 oz (71.4 kg)  Actual;Bed scale     04/30/19 0915  145 lb 1 oz (65.8 kg)  Actual;Standing scale     04/30/19 0425  165 lb (74.8 kg)  Stated       Active Problems:    Acute on chronic diastolic (congestive) heart failure (HCC)  Resolved Problems:    * No resolved hospital problems.  *      Assessment/Plan:  ~acute on chronic diastolic heart fialure   - continue daily weights- asked for a standing weight today   - continue strict I/O's   - change to PO lasix 40mg daily    - add Spironolactone (aldactone) 12.5mg low dose daily    - add back home lisinopril 20mg daily    ~Acute hypoxemic respiratory failure due to pulmonary edema   - wean oxygen   - poor cough/clearance- will add mucinex and acapella to help    ~HTN   - meds as above  ~HLD  ~Moderate AS     Discharge planning to [facility]  Karyna Ledezma CNP, 5/2/2019, 10:35 AM

## 2019-05-02 NOTE — PROGRESS NOTES
Hospitalist Progress Note      PCP: Guillermina Robbins MD    Date of Admission: 4/30/2019    Chief Complaint:  sob     Hospital Course:       Subjective: denies sob currently, nursing noted some wheezing earlier today, no overnight issues , still requiring oxygen          Medications:  Reviewed    Infusion Medications    dextrose       Scheduled Medications    atorvastatin  10 mg Oral Daily    calcium elemental  500 mg Oral BID    carvedilol  3.125 mg Oral BID WC    finasteride  5 mg Oral Daily    lidocaine  1 patch Transdermal Daily    sertraline  50 mg Oral Daily    tamsulosin  0.4 mg Oral Daily    sodium chloride flush  10 mL Intravenous 2 times per day    enoxaparin  40 mg Subcutaneous Daily    insulin lispro  0-6 Units Subcutaneous TID WC    insulin lispro  0-3 Units Subcutaneous Nightly    furosemide  40 mg Intravenous BID    ipratropium-albuterol  1 ampule Inhalation Q4H WA     PRN Meds: benzonatate, ipratropium-albuterol, sodium chloride flush, ondansetron, glucose, dextrose, glucagon (rDNA), dextrose, senna, perflutren lipid microspheres      Intake/Output Summary (Last 24 hours) at 5/2/2019 0930  Last data filed at 5/2/2019 0813  Gross per 24 hour   Intake 838 ml   Output 675 ml   Net 163 ml       Physical Exam Performed:    BP (!) 177/65   Pulse 63   Temp 96.8 °F (36 °C) (Oral)   Resp 20   Ht 5' 10\" (1.778 m)   Wt 160 lb 4.4 oz (72.7 kg)   SpO2 95%   BMI 23.00 kg/m²     General appearance:  No apparent distress, appears stated age and cooperative. Frail appearing  HEENT:  Normal cephalic, atraumatic without obvious deformity. Pupils equal, round, and reactive to light.  Extra ocular muscles intact. Conjunctivae/corneas clear. Neck: Supple, with full range of motion. No jugular venous distention. Trachea midline. Respiratory:  Normal respiratory effort.  Clear to auscultation, bilaterally without Rhonchi. bibas rales, mild scattered wheezing  Cardiovascular:  Regular rate and rhythm with normal S1/S2 without murmurs, rubs or gallops. Abdomen: Soft, non-tender, non-distended with normal bowel sounds. Musculoskeletal:  No clubbing, cyanosis or edema bilaterally.  Full range of motion without deformity. Skin: Skin color, texture, turgor normal.  No rashes or lesions. Neurologic:  Neurovascularly intact without any focal sensory/motor deficits. Cranial nerves: II-XII intact, grossly non-focal.  Psychiatric:  Alert and oriented, thought content appropriate, normal insight  Capillary Refill: Brisk,< 3 seconds   Peripheral Pulses: +2 palpable, equal bilaterally           Labs:   Recent Labs     04/30/19  0501   WBC 11.2*   HGB 16.0   HCT 50.0        Recent Labs     04/30/19  0501 05/01/19  0649 05/02/19  0716   * 138 139   K 4.1 3.7 3.4*   CL 97* 97* 96*   CO2 28 32 35*   BUN 13 10 19   CREATININE 0.7* 0.7* 0.7*   CALCIUM 8.7 8.6 8.6     Recent Labs     04/30/19  0501   AST 14*   ALT 9*   BILITOT 0.6   ALKPHOS 75     No results for input(s): INR in the last 72 hours. Recent Labs     04/30/19  0501   TROPONINI <0.01       Urinalysis:      Lab Results   Component Value Date    NITRU Negative 10/31/2017    WBCUA  10/31/2017    BACTERIA 2+ 10/31/2017    RBCUA 3-5 10/31/2017    BLOODU LARGE 10/31/2017    SPECGRAV 1.010 10/31/2017    GLUCOSEU Negative 10/31/2017       Radiology:  XR CHEST STANDARD (2 VW)   Final Result   Persistent pulmonary edema and small pleural effusions.   Lung aeration   slightly improved from yesterday's exam.         XR CHEST PORTABLE   Final Result   Suspected underlying pulmonary edema given the vascular indistinctness                 Assessment/Plan:    Active Hospital Problems    Diagnosis Date Noted    Acute on chronic diastolic (congestive) heart failure ([facility] [address] [I50.33] 04/30/2019     Acute resp failure- suspect due to new acute CHF(no prior hx of this although pt is on home lasix), not on oxygen at home  -tele  -echo done( ef 55%, g1 dd, moderate AS)  Cards consulted, apprec recs  -Iv lasix ordered  -daily wts, I/os monitored  -wean oxygen as tolerated  -duonebs q4 wa     HTN- continued coreg  -held acei     HLD- on statin     DM2- controlled, a1c 6.8  -ac/hs bs  -Low ssi  -held glipizide, metformin     BPH- on flomax, proscar           DVT Prophylaxis: on lovenox  Diet: DIET GENERAL;  Code Status: Limited    PT/OT Eval Status: rec snf    Dispo - continue diuresis and wean off oxygen, hopefully by tomorrow    Stacy Vásquez MD

## 2019-05-02 NOTE — PROGRESS NOTES
Patient's EF (Ejection Fraction) is greater than 40%    Patient has a past medical history of Depression, Diabetes mellitus ([facility] [address] Hyperlipidemia, Hypertension, Osteoporosis, PE (pulmonary thromboembolism) ([facility] [address] and Urinary retention. Comorbidities reviewed and education provided. Patient and family's stated goal of care: better understand heart failure and disease management and be more comfortable prior to discharge    Patient's current functional capacity:  Slight limitation of physical activity. Comfortable at rest. Ordinary physical activity results in fatigue, palpitation, dyspnea. Pt resting in bed at this time on 2 L O2. Pt denies shortness of breath. Pt without lower extremity edema.  Patient's weights and intake/output reviewed:    Patient Vitals for the past 96 hrs (Last 3 readings):   Weight   05/01/19 0616 157 lb 6.5 oz (71.4 kg)   04/30/19 0915 145 lb 1 oz (65.8 kg)   04/30/19 0425 165 lb (74.8 kg)       Intake/Output Summary (Last 24 hours) at 5/2/2019 0027  Last data filed at 5/1/2019 2340  Gross per 24 hour   Intake 898 ml   Output 625 ml   Net 273 ml         >>For CHF and Comorbidity documentation on Education Time and Topics, please see Education Tab

## 2019-05-02 NOTE — CARE COORDINATION
[address] Transitions Interview-BPCI    2019    Patient: Juan Ramon Best Patient : 1925   MRN: [id number]  Reason for Admission: Acute on Chronic CHF,   RARS: Readmission Risk Score: 12         Spoke with: patient Alan      Readmission Risk  Patient Active Problem List   Diagnosis    Fever    Sepsis ([facility] [address]    Lower abdominal pain    Urinary tract infection without hematuria    Other pulmonary embolism without acute cor pulmonale (HCC)    LONG TERM ANTICOAGULENT USE    SOB (shortness of breath)    Hypoxia    Tachycardia    Encephalopathy    [place] (hard of hearing)    DM (diabetes mellitus) ([facility] [address]    Essential hypertension    Hypotension    Hematuria    Septic shock (HCC)    Acute respiratory failure with hypoxia (HCC)    E coli bacteremia    Leukemoid reaction    Moderate malnutrition (HCC)    Acute on chronic diastolic (congestive) heart failure [facility])       Inpatient Assessment  Care Transitions Summary    Care Transitions Inpatient Review  Medication Review  Are you able to afford your medications?:  Yes  How often do you have difficulty taking your medications?:  I always take them as prescribed. Housing Review  Who do you live with?:  Alone  Are you an active caregiver in your home?:  No  Social Support  Do you have a ?:  No  Do you have a [address] [place] [address]?:  No  Durable Medical Equipment  Patient DME:  Blank Oas, Wheelchair, Shower chair, Other  Other Patient DME:  grab bar in shower and around toilet  Patient Home Equipment:  Nebulizer  Functional Review  Ability to seek help/take action for Emergent/Urgent situations i.e. fire, crime, inclement weather or health crisis. :  Independent  Ability handle personal hygiene needs (bathing/dressing/grooming):  Needs Assistance  Ability to manage medications:  Needs Assistance  Ability to prepare food:  Needs Assistance  Ability to maintain home (clean home, laundry):  Dependent  Ability to drive and/or has transportation:  Dependent  Ability to do shopping:  Dependent  Hearing and Vision  Visual Impairment:  Visual impairment (Glasses/contacts)  Hearing Impairment:  None  Care Transitions Interventions     Met with patient to discuss care transition. Patient states he recently moved into [facility] and is still trying to get unpacked and settled in. States he has health aides and 24hr help. Requires assist with ADL's, goes to their dining room for dinner but says he makes his own breakfast and lunch. His friend Karine sets up his meds in a 2 week organizer and he is then able to take them independently. Facility cleans his apartment and does his laundry. Karine provides transportation for him and does his shopping. Discharge plan at this time is for him to go to [facility] for STR then return to [facility]. Patient informed about the [facility] Medicare Initiative. Explained program and provided them with [address] Notification Letter. If meets criteria, patient agreeable to post discharge f/u calls. Encouraged patient to ask to speak with  on the unit should any needs arise. Follow Up  No future appointments. Health Maintenance  There are no preventive care reminders to display for this patient.     Shira Patel RN

## 2019-05-03 PROBLEM — I35.0 NONRHEUMATIC AORTIC VALVE STENOSIS: Status: ACTIVE | Noted: 2019-01-01

## 2019-05-03 NOTE — PROGRESS NOTES
paged \"FYI: Patient having low urine output today. Patient had cystoscopy and stents removed recently. Do you want to consult urology? Thank you. \"  Awaiting response.

## 2019-05-03 NOTE — PROGRESS NOTES
RESPIRATORY THERAPY ASSESSMENT    Name:  Burke Hargrove  Medical Record Number:  [id number]  Age: 80 y.o. Gender: male  : 1925  Today's Date:  5/3/2019  Room:  0219/0219-01    Assessment     Is the patient being admitted for a COPD or Asthma exacerbation? No   (If yes the patient will be seen every 4 hours for the first 24 hours and then reassessed)    Patient Admission Diagnosis      Allergies  Allergies   Allergen Reactions    Celebrex [Celecoxib]     Penicillins        Minimum Predicted Vital Capacity:     1095          Actual Vital Capacity:      250              Pulmonary History:No history  Home Oxygen Therapy:  room air  Home Respiratory Therapy:Albuterol/Ipratropium Bromide HHN Q4PRN  Current Respiratory Therapy:  Duoneb HHN Q4WA  Treatment Type: HHN, Vibratory Mucous Clearing Therapy or Intervention  Medications: Albuterol/Ipratropium    Respiratory Severity Index(RSI)   Patients with orders for inhalation medications, oxygen, or any therapeutic treatment modality will be placed on Respiratory Protocol. They will be assessed with the first treatment and at least every 72 hours thereafter. The following severity scale will be used to determine frequency of treatment intervention. Smoking History: No Smoking History = 0    Social History  Social History     Tobacco Use    Smoking status: Never Smoker    Smokeless tobacco: Never Used   Substance Use Topics    Alcohol use: Not on file    Drug use: Not on file       Recent Surgical History: None = 0  Past Surgical History  History reviewed. No pertinent surgical history.     Level of Consciousness: Alert, Oriented, and Cooperative = 0    Level of Activity: Walking with assistance = 1    Respiratory Pattern: Dyspnea with exertion;Irregular pattern;or RR less than 6 = 2    Breath Sounds: Absent bilaterally and/or with wheezes = 3    Sputum  Sputum Color: None, Tenacity: None, Sputum How Obtained: Cough on request  Cough: Strong, spontaneous, non-productive = 0    Vital Signs   BP (!) 163/75   Pulse 62   Temp 97.8 °F (36.6 °C) (Oral)   Resp 18   Ht 5' 10\" (1.778 m)   Wt 154 lb 11.2 oz (70.2 kg)   SpO2 94%   BMI 22.20 kg/m²   SPO2 (COPD values may differ): 90-91% on room air or greater than 92% on FiO2 24- 28% = 1    Peak Flow (asthma only): not applicable = 0    RSI: 7-8 = BID and Q4HPRN (every four hours as needed) for dyspnea        Plan       Goals: medication delivery, mobilize retained secretions, volume expansion and improve oxygenation    Patient/caregiver was educated on the proper method of use for Respiratory Care Devices:  Yes      Level of patient/caregiver understanding able to:   ? Verbalize understanding   ? Demonstrate understanding       ? Teach back        ? Needs reinforcement       ? No available caregiver               ? Other:     Response to education:  Good     Is patient being placed on Home Treatment Regimen? No     Does the patient have everything they need prior to discharge? NA     Comments: meds and chart reviewed    Plan of Care: continue current regimen for continued sob and wheezing    Electronically signed by Nicholas Best RCP on 5/3/2019 at 12:02 PM    Respiratory Protocol Guidelines     1. Assessment and treatment by Respiratory Therapy will be initiated for medication and therapeutic interventions upon initiation of aerosolized medication. 2. Physician will be contacted for respiratory rate (RR) greater than 35 breaths per minute. Therapy will be held for heart rate (HR) greater than 140 beats per minute, pending direction from physician. 3. Bronchodilators will be administered via Metered Dose Inhaler (MDI) with spacer when the following criteria are met:  a. Alert and cooperative     b. HR < 140 bpm  c. RR < 30 bpm                d. Can demonstrate a 2-3 second inspiratory hold  4.  Bronchodilators will be administered via Hand Held Nebulizer NILSA [facility]) to patients when ANY of the following criteria are met  a. Incognizant or uncooperative          b. Patients treated with HHN at Home        c. Unable to demonstrate proper use of MDI with spacer     d. RR > 30 bpm   5. Bronchodilators will be delivered via Metered Dose Inhaler (MDI), HHN, Aerogen to intubated patients on mechanical ventilation. 6. Inhalation medication orders will be delivered and/or substituted as outlined below. Aerosolized Medications Ordering and Administration Guidelines:    1. All Medications will be ordered by a physician, and their frequency and/or modality will be adjusted as defined by the patients Respiratory Severity Index (RSI) score. 2. If the patient does not have documented COPD, consider discontinuing anticholinergics when RSI is less than 9.  3. If the bronchospasm worsens (increased RSI), then the bronchodilator frequency can be increased to a maximum of every 4 hours. If greater than every 4 hours is required, the physician will be contacted. 4. If the bronchospasm improves, the frequency of the bronchodilator can be decreased, based on the patient's RSI, but not less than home treatment regimen frequency. 5. Bronchodilator(s) will be discontinued if patient has a RSI less than 9 and has received no scheduled or as needed treatment for 72  Hrs. Patients Ordered on a Mucolytic Agent:    1. Must always be administered with a bronchodilator. 2. Discontinue if patient experiences worsened bronchospasm, or secretions have lessened to the point that the patient is able to clear them with a cough. Anti-inflammatory and Combination Medications:    1. If the patient lacks prior history of lung disease, is not using inhaled anti-inflammatory medication at home, and lacks wheezing by examination or by history for at least 24 hours, contact physician for possible discontinuation.

## 2019-05-03 NOTE — PROGRESS NOTES
[facility]   Daily Progress Note    Admit Date:  4/30/2019  HPI:    Chief Complaint   Patient presents with    Shortness of Breath     from assisted living, states SOB began 1-2hrs PTA        Interval history:   Corwin Dobbs is being followed for heart failure. Was previously prescribed Lasix to use prn for swelling. Urology instructed him to increase fluid intake. He started with ankle swelling after Easter than continued to worsen despite taking the Lasix. He then underwent cystoscopy last Monday likely receiving IV fluids which contributed to further fluid overload. Subjective:  Mr. Kenneth Horn reports swelling and breathing at his baseline. He still has a cough but remains non-productive.      Objective:   BP (!) 163/75   Pulse 62   Temp 97.8 °F (36.6 °C) (Oral)   Resp 18   Ht 5' 10\" (1.778 m)   Wt 154 lb 11.2 oz (70.2 kg)   SpO2 94%   BMI 22.20 kg/m²       Intake/Output Summary (Last 24 hours) at 5/3/2019 1451  Last data filed at 5/3/2019 1354  Gross per 24 hour   Intake 360 ml   Output 525 ml   Net -165 ml     Telemetry:  SR 60's with PVC's (singles and pairs)  NYHA: IV    Physical Exam:  General:  Awake, alert, NAD  Skin:  Warm and dry  Neck:  JVD 9-10 cm  Chest:  + course wheezes/ rhonchi t/o posteriorly  Cardiovascular:  RRR, S1S2, + murmur, no r/g  Abdomen:  Soft, nontender, +bowel sounds  Extremities:  No BLE edema    Medications:    guaiFENesin  600 mg Oral BID    furosemide  40 mg Oral Daily    spironolactone  12.5 mg Oral Daily    atorvastatin  10 mg Oral Daily    calcium elemental  500 mg Oral BID    carvedilol  3.125 mg Oral BID WC    finasteride  5 mg Oral Daily    lidocaine  1 patch Transdermal Daily    sertraline  50 mg Oral Daily    tamsulosin  0.4 mg Oral Daily    sodium chloride flush  10 mL Intravenous 2 times per day    enoxaparin  40 mg Subcutaneous Daily    insulin lispro  0-6 Units Subcutaneous TID     insulin lispro  0-3 Units Subcutaneous Nightly    ipratropium-albuterol  1 ampule Inhalation Q4H WA      dextrose         Lab Data:  CBC:   No results for input(s): WBC, HGB, PLT in the last 72 hours. BMP:    Recent Labs     05/01/19  0649 05/02/19  0716 05/03/19  0702    139 138   K 3.7 3.4* 3.4*   CO2 32 35* 35*   BUN 10 19 18   CREATININE 0.7* 0.7* 0.6*     INR:  No results for input(s): INR in the last 72 hours. BNP:    Recent Labs     05/02/19  0716   PROBNP 1,500*     No results found for: LVEF, LVEFMODE    Echo 5/1/19   Summary   Technically difficult examination due to patient immobility. No IV access was available for contrast.   Normal left ventricular systolic function with a visually estimated ejection fraction of 55%. No regional wall motion abnormalities are seen. Grade I diastolic dysfunction with normal LV filling pressures. The right ventricle is not well visualized but appears enlarged    Mild right atrial enlargement. Compared to the last echo on 11/01/2017 (2.45 m/sec), aortic valve velocities appear to have decreased. Findings are consistent with moderate AS. Frequent ventricular ectopy during exam.     weights   05/02/19 0640  160 lb 4.4 oz (72.7 kg)  Bed scale     05/01/19 0616  157 lb 6.5 oz (71.4 kg)  Actual;Bed scale     04/30/19 0915  145 lb 1 oz (65.8 kg)  Actual;Standing scale     04/30/19 0425  165 lb (74.8 kg)  Stated       Active Problems:    Acute on chronic diastolic (congestive) heart failure (HCC)  Resolved Problems:    * No resolved hospital problems.  *      Assessment/Plan:  ~acute on chronic diastolic heart fialure   - continue daily weights-stable last 2 days   - continue strict I/O's   - PO lasix 40mg 3 days per week at discharge plus an additional dose as needed for swelling   - continue Spironolactone (aldactone) 12.5mg low dose daily    - continue lisinopril 20mg daily    ~Acute hypoxemic respiratory failure due to pulmonary edema   - wean oxygen   - poor cough/clearance- will add mucinex and acapella to help    ~HTN   - elevated, resume ACEi and adding shirin antagonist as above  ~HLD  ~Moderate AS     Okay for discharge from cardiac perspective. Will review cardiac medications on discharge medication reconciliation form. Patient has follow up appointment with [facility] in 2 weeks.     Chris Heart CNP, 5/3/2019, 2:51 PM

## 2019-05-03 NOTE — PROGRESS NOTES
Hospitalist Progress Note      PCP: Thanh Mccrary MD    Date of Admission: 4/30/2019    Chief Complaint:  sob     Hospital Course:       Subjective: denies sob currently, nursing noted some wheezing earlier today, no overnight issues , still requiring oxygen, thinks he is more congested           Medications:  Reviewed    Infusion Medications    dextrose       Scheduled Medications    acetaminophen        magnesium sulfate  2 g Intravenous Once    potassium chloride  40 mEq Oral Once    guaiFENesin  600 mg Oral BID    furosemide  40 mg Oral Daily    spironolactone  12.5 mg Oral Daily    atorvastatin  10 mg Oral Daily    calcium elemental  500 mg Oral BID    carvedilol  3.125 mg Oral BID WC    finasteride  5 mg Oral Daily    lidocaine  1 patch Transdermal Daily    sertraline  50 mg Oral Daily    tamsulosin  0.4 mg Oral Daily    sodium chloride flush  10 mL Intravenous 2 times per day    enoxaparin  40 mg Subcutaneous Daily    insulin lispro  0-6 Units Subcutaneous TID WC    insulin lispro  0-3 Units Subcutaneous Nightly    ipratropium-albuterol  1 ampule Inhalation Q4H WA     PRN Meds: acetaminophen, benzonatate, ipratropium-albuterol, sodium chloride flush, ondansetron, glucose, dextrose, glucagon (rDNA), dextrose, senna, perflutren lipid microspheres      Intake/Output Summary (Last 24 hours) at 5/3/2019 0826  Last data filed at 5/3/2019 0734  Gross per 24 hour   Intake 610 ml   Output 775 ml   Net -165 ml       Physical Exam Performed:    BP (!) 154/78   Pulse 60   Temp 96.9 °F (36.1 °C) (Oral)   Resp 18   Ht 5' 10\" (1.778 m)   Wt 154 lb 11.2 oz (70.2 kg)   SpO2 91%   BMI 22.20 kg/m²       General appearance:  No apparent distress, appears stated age and cooperative. Frail appearing  HEENT:  Normal cephalic, atraumatic without obvious deformity. Pupils equal, round, and reactive to light.  Extra ocular muscles intact. Conjunctivae/corneas clear.   Neck: Supple, with full range of motion. No jugular venous distention. Trachea midline. Respiratory:  Normal respiratory effort. Clear to auscultation, bilaterally without Rhonchi. bibas rales, mild scattered wheezing and ronchi  Cardiovascular:  Regular rate and rhythm with normal S1/S2 without murmurs, rubs or gallops. Abdomen: Soft, non-tender, non-distended with normal bowel sounds. Musculoskeletal:  No clubbing, cyanosis or edema bilaterally.  Full range of motion without deformity. Skin: Skin color, texture, turgor normal.  No rashes or lesions. Neurologic:  Neurovascularly intact without any focal sensory/motor deficits. Cranial nerves: II-XII intact, grossly non-focal.  Psychiatric:  Alert and oriented, thought content appropriate, normal insight  Capillary Refill: Brisk,< 3 seconds   Peripheral Pulses: +2 palpable, equal bilaterally           Labs:   No results for input(s): WBC, HGB, HCT, PLT in the last 72 hours. Recent Labs     05/01/19  0649 05/02/19  0716 05/03/19  0702    139 138   K 3.7 3.4* 3.4*   CL 97* 96* 95*   CO2 32 35* 35*   BUN 10 19 18   CREATININE 0.7* 0.7* 0.6*   CALCIUM 8.6 8.6 8.9     No results for input(s): AST, ALT, BILIDIR, BILITOT, ALKPHOS in the last 72 hours. No results for input(s): INR in the last 72 hours. No results for input(s): Saba Halon in the last 72 hours. Urinalysis:      Lab Results   Component Value Date    NITRU Negative 10/31/2017    WBCUA  10/31/2017    BACTERIA 2+ 10/31/2017    RBCUA 3-5 10/31/2017    BLOODU LARGE 10/31/2017    SPECGRAV 1.010 10/31/2017    GLUCOSEU Negative 10/31/2017       Radiology:  XR CHEST STANDARD (2 VW)   Final Result   Persistent pulmonary edema and small pleural effusions.   Lung aeration   slightly improved from yesterday's exam.         XR CHEST PORTABLE   Final Result   Suspected underlying pulmonary edema given the vascular indistinctness                 Assessment/Plan:    Active Hospital Problems    Diagnosis Date Noted    Acute on

## 2019-05-03 NOTE — PROGRESS NOTES
Physical Therapy  Facility/Department: [facility] A2 CARD TELEMETRY  Daily Treatment Note  NAME: Elly Scruggs  : 1925  MRN: [id number]    Date of Service: 5/3/2019    Discharge Recommendations:  Subacute/Skilled Nursing Facility   PT Equipment Recommendations  Equipment Needed: No    Patient Diagnosis(es): The primary encounter diagnosis was New onset of congestive heart failure ([facility] A diagnosis of Shortness of breath was also pertinent to this visit. has a past medical history of Depression, Diabetes mellitus ([facility] Hyperlipidemia, Hypertension, Osteoporosis, PE (pulmonary thromboembolism) ([facility] [address] and Urinary retention. has no past surgical history on file. Restrictions  Restrictions/Precautions: General Precautions, Fall Risk  Other position/activity restrictions: High fall risk, 2 L O 2, telemetry  Subjective   Chart Reviewed: Yes  Response To Previous Treatment: Patient with no complaints from previous session. Family / Caregiver Present: No  Referring Practitioner: Dr. Fan Waldrop MD  Subjective: pt agreeable to therapy, reporting arthritic knee pain today limiting mobility  Comments: Pt resting in bed on approach; RN cleared pt for therapy  Pain Screening  Patient Currently in Pain: Yes  Pain Assessment  Pain Assessment: Faces  Roberts-Baker Pain Rating: Hurts whole lot  Pain Type: Acute pain;Chronic pain  Pain Location: Knee  Pain Orientation: Right;Left  Pain Descriptors: Discomfort  Pain Frequency: Intermittent(with wt bearing and with knee flexion on ex)  Pain Onset: On-going  Clinical Progression: Gradually worsening  Functional Pain Assessment: Prevents or interferes some active activities and ADLs  Non-Pharmaceutical Pain Intervention(s): Emotional support; Rest       Orientation  Orientation  Overall Orientation Status: Within Functional Limits  Objective   Bed mobility  Supine to Sit: Contact guard assistance  Sit to Supine: Contact guard assistance  Scooting: Minimal assistance(To scoot up in bed supine pt cued to reach for bed rails, HOB placed flat, assisted pt to bend knees with feet flat and therapist stabilized feet as pt scooted up in 3 tries)  Comment: pt practiced supine to sit 2 x, sit to supine 1 x during session  Transfers  Sit to Stand: Contact guard assistance  Stand to sit: Contact guard assistance  Squat Pivot Transfers: Contact guard assistance  Ambulation  Ambulation?: No(Pt stood with walker, marched in place 2 x bilat slowly, due to knee pain with WB L>R unable to amb today)        Exercises  Bridging: 3 x  Straight Leg Raise: 10 x B  Heelslides: 10 x B  Hip Abduction: 10 x B supine  Ankle Pumps: 10 x B  Hooklying lower trunk rotation: 10 x B  Comments: pt needed to do ex slowly, ce if knee ROM involved due to arthritic knee pain today     Assessment   Body structures, Functions, Activity limitations: Decreased functional mobility ; Decreased endurance;Decreased strength;Decreased safe awareness;Decreased balance  Assessment: Today pt able to participate in LE Ex supine, bed mob CG, sit to stand CG, unable to amb due to arthritic knee pain today. Remains functioning below baseline with need for skilled PT to regain functional mob/gait/balance/stregnth and endurance. CUes for safety.  Recommend SNF for rehab to address deficits  Treatment Diagnosis: decreased (I) with mobility  Specific instructions for Next Treatment: progress mobility as tolerated, ex  Prognosis: Good  Patient Education: ther ex, safety, bed mob, transfers  Barriers to Learning: pt voices understanding  REQUIRES PT FOLLOW UP: Yes  Activity Tolerance  Activity Tolerance: Patient Tolerated treatment well;Patient limited by pain       AM-PAC Score     AM-PAC Inpatient Mobility without Stair Climbing Raw Score : 14  AM-PAC Inpatient without Stair Climbing T-Scale Score : 40.85  Mobility Inpatient CMS 0-100% Score: 53.33  Mobility Inpatient without Stair CMS G-Code Modifier : CK     Goals  Short term goals  Time Frame for Short term goals: 1 week (5/8) unless otherwise specified  Short term goal 1: Pt will be supervision with bed mobility.- CG/cues on 5/3  Short term goal 2: Pt will be SBA for transfers with RW.- CG sit to stand to RW with bed elevated on 5/3  Short term goal 3: Pt will be SBA for ambulation 100 ft with RW.- unable to amb due to arthritic knee pain on 5/3  Short term goal 4: 5/3: Pt will participate in 12-15 reps of BLE exercises to promote strength and activity tolerance. - pt performing 10 reps assisted LE Ex on 5/3  Patient Goals   Patient goals : \"to go home\"    Plan    Times per week: 3-5x/wk  Times per day: Daily  Specific instructions for Next Treatment: progress mobility as tolerated, ex  Current Treatment Recommendations: Strengthening, Gait Training, Patient/Caregiver Education & Training, Balance Training, Neuromuscular Re-education, Functional Mobility Training, Endurance Training, Transfer Training, Safety Education & Training  Safety Devices  Type of devices:  All fall risk precautions in place, Call light within reach, Chair alarm in place, Gait belt, Patient at risk for falls, Nurse notified, Left in chair     Therapy Time   Individual Concurrent Group Co-treatment   Time In 1105         Time Out 1130         Minutes 25               If pt discharges prior to next visit this note will serve as discharge summary  Gatito Lopez, [id number]

## 2019-05-03 NOTE — CONSULTS
Pulmonary & Critical Care Consultation Note   Ansley Thomas MD    REASONFOR CONSULTATION/CC: acute resp failure    Consult at request of  Linda Shabazz MD  PCP: Radames Monte MD    HISTORYOF PRESENT ILLNESS:    80y.o. year old male without significant baseline respiratory history, nonsmoker. He was admitted on 4/30 following a cystoscopy with persistent postop hypoxia. He was treated for acute CHF, cardiology was involved in his care. Despite diuresis and bronchodilators he remained hypoxic and congested prompting pulmonary input. When seen he denied congestion or shortness of breath but had a wet sounding cough, does not think that he feels any better or different after inhaled therapies. Past Medical History:   Diagnosis Date    Depression     Diabetes mellitus ([facility]     Hyperlipidemia     Hypertension     Osteoporosis     [facility]     Urinary retention        History reviewed. No pertinent surgical history. family history is not on file.     Social History     Tobacco Use    Smoking status: Never Smoker    Smokeless tobacco: Never Used   Substance Use Topics    Alcohol use: Not on file        Scheduled Meds:   guaiFENesin  600 mg Oral BID    furosemide  40 mg Oral Daily    spironolactone  12.5 mg Oral Daily    atorvastatin  10 mg Oral Daily    calcium elemental  500 mg Oral BID    carvedilol  3.125 mg Oral BID WC    finasteride  5 mg Oral Daily    lidocaine  1 patch Transdermal Daily    sertraline  50 mg Oral Daily    tamsulosin  0.4 mg Oral Daily    sodium chloride flush  10 mL Intravenous 2 times per day    enoxaparin  40 mg Subcutaneous Daily    insulin lispro  0-6 Units Subcutaneous TID WC    insulin lispro  0-3 Units Subcutaneous Nightly    ipratropium-albuterol  1 ampule Inhalation Q4H WA       Continuous Infusions:   dextrose         PRN Meds:   acetaminophen, benzonatate, ipratropium-albuterol, sodium chloride flush, ondansetron, glucose, dextrose, glucagon (rDNA), dextrose, senna, perflutren lipid microspheres   Consults  ALLERGIES:  Patient is allergic to celebrex [celecoxib] and penicillins. REVIEW OF SYSTEMS:  Review of Systems   Constitutional: Negative for chills, fever and unexpected weight change. HENT: Negative for mouth sores, sore throat and voice change. Eyes: Negative for pain, discharge and itching. Respiratory: Negative for choking, chest tightness and stridor. Cardiovascular: Negative for chest pain, palpitations and leg swelling. Gastrointestinal: Negative for abdominal pain, constipation and diarrhea. Endocrine: Negative for cold intolerance, heat intolerance and polydipsia. Genitourinary: Negative for dysuria, frequency and hematuria. Musculoskeletal: Negative for gait problem, joint swelling and neck stiffness. Neurological: Negative for dizziness, numbness and headaches. Psychiatric/Behavioral: Negative for agitation, confusion and hallucinations. Objective:   PHYSICAL EXAM:  BP (!) 163/75   Pulse 62   Temp 97.8 °F (36.6 °C) (Oral)   Resp 18   Ht 5' 10\" (1.778 m)   Wt 154 lb 11.2 oz (70.2 kg)   SpO2 94%   BMI 22.20 kg/m²    Physical Exam   Constitutional: He appears well-developed and well-nourished. No distress. HENT:   Head: Normocephalic and atraumatic. Mouth/Throat: Oropharynx is clear and moist. No oropharyngeal exudate. Eyes: Pupils are equal, round, and reactive to light. EOM are normal.   Neck: Neck supple. No JVD present. Cardiovascular: Normal heart sounds. Exam reveals no gallop and no friction rub. No murmur heard. Pulmonary/Chest: Effort normal. He has no wheezes. He has no rales. Equal chest rise and expansion bilaterally   Abdominal: Soft. Bowel sounds are normal. He exhibits no distension. There is no tenderness. Musculoskeletal: Normal range of motion. He exhibits no edema. Lymphadenopathy:     He has no cervical adenopathy.    Neurological: He is

## 2019-05-03 NOTE — PROGRESS NOTES
Patient having low UO, bladder scan ordered. Patient retaining 517 ml. MD notified. MD ordered one time straight cath. Urology consulted.

## 2019-05-03 NOTE — PROGRESS NOTES
Occupational Therapy      Therapist attempted to see pt. NP in room discussing POC with pt. Will try back at later date as schedule allows.     Abi Daley, OTR/L

## 2019-05-04 NOTE — PROGRESS NOTES
Hospitalist Progress Note      PCP: Kaitlin Blake MD    Date of Admission: 4/30/2019    Chief Complaint:  sob     Hospital Course:       Subjective: denies sob/cp/abd pain currently, nursing noted some wheezing earlier today, no overnight issues , still requiring oxygen, thinks he is less congested (thinks he is always congested and coughing)             Medications:  Reviewed    Infusion Medications    dextrose       Scheduled Medications    guaiFENesin  600 mg Oral BID    furosemide  40 mg Oral Daily    spironolactone  12.5 mg Oral Daily    atorvastatin  10 mg Oral Daily    calcium elemental  500 mg Oral BID    carvedilol  3.125 mg Oral BID WC    finasteride  5 mg Oral Daily    lidocaine  1 patch Transdermal Daily    sertraline  50 mg Oral Daily    tamsulosin  0.4 mg Oral Daily    sodium chloride flush  10 mL Intravenous 2 times per day    enoxaparin  40 mg Subcutaneous Daily    insulin lispro  0-6 Units Subcutaneous TID     insulin lispro  0-3 Units Subcutaneous Nightly    ipratropium-albuterol  1 ampule Inhalation Q4H WA     PRN Meds: acetaminophen, benzonatate, ipratropium-albuterol, sodium chloride flush, ondansetron, glucose, dextrose, glucagon (rDNA), dextrose, senna, perflutren lipid microspheres      Intake/Output Summary (Last 24 hours) at 5/4/2019 0840  Last data filed at 5/4/2019 2708  Gross per 24 hour   Intake 360 ml   Output 1100 ml   Net -740 ml       Physical Exam Performed:    BP (!) 192/81   Pulse 66   Temp 98.3 °F (36.8 °C) (Oral)   Resp 16   Ht 5' 10\" (1.778 m)   Wt 150 lb 11.2 oz (68.4 kg)   SpO2 98%   BMI 21.62 kg/m²     General appearance:  No apparent distress, appears stated age and cooperative. Frail appearing  HEENT:  Normal cephalic, atraumatic without obvious deformity. Pupils equal, round, and reactive to light.  Extra ocular muscles intact. Conjunctivae/corneas clear. Neck: Supple, with full range of motion. No jugular venous distention.  Trachea midline. Respiratory:  Normal respiratory effort. Clear to auscultation, bilaterally without Rhonchi. bibas rales better, mild scattered ronchi noted  Cardiovascular:  Regular rate and rhythm with normal S1/S2 without murmurs, rubs or gallops. Abdomen: Soft, non-tender, non-distended with normal bowel sounds. Musculoskeletal:  No clubbing, cyanosis or edema bilaterally.  Full range of motion without deformity. Skin: Skin color, texture, turgor normal.  No rashes or lesions. Neurologic:  Neurovascularly intact without any focal sensory/motor deficits. Cranial nerves: II-XII intact, grossly non-focal.  Psychiatric:  Alert and oriented, thought content appropriate, normal insight  Capillary Refill: Brisk,< 3 seconds   Peripheral Pulses: +2 palpable, equal bilaterally           Labs:   No results for input(s): WBC, HGB, HCT, PLT in the last 72 hours. Recent Labs     05/02/19  0716 05/03/19  0702 05/04/19  0701    138 139   K 3.4* 3.4* 4.1   CL 96* 95* 98*   CO2 35* 35* 33*   BUN 19 18 15   CREATININE 0.7* 0.6* <0.5*   CALCIUM 8.6 8.9 9.2     No results for input(s): AST, ALT, BILIDIR, BILITOT, ALKPHOS in the last 72 hours. No results for input(s): INR in the last 72 hours. No results for input(s): Benny Manning in the last 72 hours. Urinalysis:      Lab Results   Component Value Date    NITRU Negative 10/31/2017    WBCUA  10/31/2017    BACTERIA 2+ 10/31/2017    RBCUA 3-5 10/31/2017    BLOODU LARGE 10/31/2017    SPECGRAV 1.010 10/31/2017    GLUCOSEU Negative 10/31/2017       Radiology:  XR CHEST STANDARD (2 VW)   Final Result   Persistent pulmonary edema and small pleural effusions.   Lung aeration   slightly improved from yesterday's exam.         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

## 2019-05-04 NOTE — CARE COORDINATION
CASE MANAGEMENT DISCHARGE SUMMARY      Discharge to: skilled at [facility] [address] [place] [address]  completed: [place] Medicare stay met   Hospital Exemption Notification (HENS) completed: yes    New Durable Medical Equipment ordered/agency: per facility    Transportation:    Family/car: no   Medical Transport explained to pt/family. Pt/family voice no agency preference. Agency used: [facility]   time: [address] form completed: Yes    Notified:    Family: yes   Facility/Agency: [facility] faxed   RN: yes   Phone number for report to facility: [phone]    Note: Discharging nurse to complete [facility], reconcile AVS, and place final copy with patient's discharge packet. RN to ensure that written prescriptions for  Level II medications are sent with patient to the facility as per protocol.

## 2019-05-04 NOTE — PLAN OF CARE
Increase function to baseline.
Patient's EF (Ejection Fraction) is greater than 40%    Patient has a past medical history of Depression, Diabetes mellitus ([facility] [address] [address] Hyperlipidemia, Hypertension, Osteoporosis, PE (pulmonary thromboembolism) ([facility] [address] [facility] and Urinary retention. Comorbidities reviewed and education provided. Patient and family's stated goal of care: reduce shortness of breath, increase activity tolerance, better understand heart failure and disease management, be more comfortable and reduce lower extremity edema prior to discharge    Patient's current functional capacity:  Slight limitation of physical activity. Comfortable at rest. Ordinary physical activity results in fatigue, palpitation, dyspnea. Pt resting in bed at this time on 1 L O2. Pt denies shortness of breath. Pt without lower extremity edema.  Patient's weights and intake/output reviewed:    Patient Vitals for the past 96 hrs (Last 3 readings):   Weight   05/02/19 1132 154 lb (69.9 kg)   05/02/19 0640 160 lb 4.4 oz (72.7 kg)   05/01/19 0616 157 lb 6.5 oz (71.4 kg)       Intake/Output Summary (Last 24 hours) at 5/2/2019 0228  Last data filed at 5/2/2019 2241  Gross per 24 hour   Intake 610 ml   Output 600 ml   Net 10 ml         >>For CHF and Comorbidity documentation on Education Time and Topics, please see Education Tab
Problem: Falls - Risk of:  Goal: Will remain free from falls  Description  Will remain free from falls  Outcome: Ongoing  Note:   Pt high fall risk. Instructed to use call light before getting out of bed. Assist x1. Call light within reach. Bed in low position. Bed alarm on. Will continue to monitor. Problem: Risk for Impaired Skin Integrity  Goal: Tissue integrity - skin and mucous membranes  Description  Structural intactness and normal physiological function of skin and  mucous membranes. Outcome: Ongoing  Note:   Patient's skin assessed. See flowsheet. Patient turned in bed. Pressure ulcer prevention in place. No issues with skin integrity this shift. Will continue to monitor.
Problem: OXYGENATION/RESPIRATORY FUNCTION  Goal: Patient will maintain patent airway  Intervention: POSITION PATIENT FOR MAXIMUM VENTILATORY EFFICIENCY  Note:   PT 87-88% on 1L, pt repositioned in bed, asked to cough deep breathe, pt remained 87-88%. Pt now on 2L NC, 92-93%.
Problem: OXYGENATION/RESPIRATORY FUNCTION  Goal: Patient will maintain patent airway  Outcome: Ongoing      Pt continues on 2L of O2, no s/s of SOB. Will continue to monitor. Problem: Serum Glucose Level - Abnormal:  Goal: Ability to maintain appropriate glucose levels will improve  Description  Ability to maintain appropriate glucose levels will improve  Outcome: Ongoing       Pt continues wit glucose monitoring, appropriate diet and insulin therapy when needed.
Pt educated on the importance of a carb control diet. Monitoring pt's blood glucose AC/HS. Sliding scale insulin given as ordered according to pt's blood glucose. Will continue to monitor.
UE There ex, Bed mobility, Transfers, ADL training, Adaptative equipment training, Therapeutic activity, Neuromuscular re-education, Patient education
Carb/insulin ratio. Fats: Good fats and bad fats, meal planning and supplements. Physical activity: advised to exercise 5-7 days a week 30-60 mins at least. Discussed how it affects BS readings. Managing high and low sugar readings.

## 2019-05-04 NOTE — PROGRESS NOTES
Pt was given d/c instructions per [facility] transport here to get patient. Called the [facility] to give report. States they will call back.  Pt d/c per transport awake alert and oriented x4

## 2019-05-04 NOTE — PROGRESS NOTES
Bedside report given to [facility], RN. Pt alert and oriented, bed locked in lowest position with call light, overbed table and belongings in reach. No further needs at this time.

## 2019-05-04 NOTE — PROGRESS NOTES
Patient is awake, alert and oriented x4. Assessment is complete, see flow sheet. Bed in lowest position, wheels locked, call light is within reach. Patient denies any further needs at the moment. Will continue to monitor.     BP (!) 192/81   Pulse 66   Temp 98.3 °F (36.8 °C) (Oral)   Resp 16   Ht 5' 10\" (1.778 m)   Wt 150 lb 11.2 oz (68.4 kg)   SpO2 98%   BMI 21.62 kg/m²     Blood Glucose 142

## 2019-05-04 NOTE — CONSULTS
mg, 4 mg, Intravenous, Q6H PRN  enoxaparin (LOVENOX) injection 40 mg, 40 mg, Subcutaneous, Daily  glucose (GLUTOSE) 40 % oral gel 15 g, 15 g, Oral, PRN  dextrose 50 % solution 12.5 g, 12.5 g, Intravenous, PRN  glucagon (rDNA) injection 1 mg, 1 mg, Intramuscular, PRN  dextrose 5 % solution, 100 mL/hr, Intravenous, PRN  senna (SENOKOT) tablet 8.6 mg, 1 tablet, Oral, Daily PRN  insulin lispro (HUMALOG) injection vial 0-6 Units, 0-6 Units, Subcutaneous, TID WC  insulin lispro (HUMALOG) injection vial 0-3 Units, 0-3 Units, Subcutaneous, Nightly  perflutren lipid microspheres (DEFINITY) injection 1.65 mg, 1.5 mL, Intravenous, ONCE PRN  ipratropium-albuterol (DUONEB) nebulizer solution 1 ampule, 1 ampule, Inhalation, Q4H WA  Allergies: Allergies   Allergen Reactions    Celebrex [Celecoxib]     Penicillins      Social History:  Reviewed, non contributory    Family History:  Reviewed, non contributory      REVIEW OF SYSTEMS:    negative    PHYSICAL EXAM:    VITALS:  BP (!) 177/71   Pulse 66   Temp 97.6 °F (36.4 °C) (Axillary)   Resp 16   Ht 5' 10\" (1.778 m)   Wt 150 lb 11.2 oz (68.4 kg)   SpO2 97%   BMI 21.62 kg/m²   H&N: Sclera normal, no masses, trachea midline, no bruit  CVS: Normal rate and rhythm, no murmurs or rubs, peripheral pulses equal, no clubbing or cyanosis. RESP: Breath sounds equal bilateral, few rhonchi. ABDO: Soft, non-tender, bowel sounds active, no organomegaly, no hernias. LYMPH:  No lymphadenopathy. Skin: Warm dry and intact. : No CVAT, normal external genitalia, no discharge. MSK: Grossly normal for patient  QUINTON: Grossly normal for patient  PSY: No acute changes noted in psychosocial assessment. DATA:    Old records have not been requested    IMPRESSION/RECOMMENDATIONS:      Pt with recurrent retention following cysto stone removal. When ready for d/c from medical standpoint ,d/c home with ortez and f/u [facility] Agent as outpatient.  He may need turp in future    Thank you for asking me to

## 2019-05-04 NOTE — PROGRESS NOTES
Pulmonary & Critical Care Inpatient Progress Note   Katherine Lang MD     REASON FOR TODAY'S VISIT:  Acute on chronic resp failure    SUBJECTIVE:   Remains on simple nasal cannula   No expressed complaints    Scheduled Meds:   magnesium sulfate  2 g Intravenous Once    guaiFENesin  600 mg Oral BID    furosemide  40 mg Oral Daily    spironolactone  12.5 mg Oral Daily    atorvastatin  10 mg Oral Daily    calcium elemental  500 mg Oral BID    carvedilol  3.125 mg Oral BID WC    finasteride  5 mg Oral Daily    lidocaine  1 patch Transdermal Daily    sertraline  50 mg Oral Daily    tamsulosin  0.4 mg Oral Daily    sodium chloride flush  10 mL Intravenous 2 times per day    enoxaparin  40 mg Subcutaneous Daily    insulin lispro  0-6 Units Subcutaneous TID WC    insulin lispro  0-3 Units Subcutaneous Nightly    ipratropium-albuterol  1 ampule Inhalation Q4H WA       Continuous Infusions:   dextrose         PRN Meds:  acetaminophen, benzonatate, ipratropium-albuterol, sodium chloride flush, ondansetron, glucose, dextrose, glucagon (rDNA), dextrose, senna, perflutren lipid microspheres    ALLERGIES:  Patient is allergic to celebrex [celecoxib] and penicillins. Objective:   PHYSICAL EXAM:  BP (!) 192/81   Pulse 66   Temp 98.3 °F (36.8 °C) (Oral)   Resp 16   Ht 5' 10\" (1.778 m)   Wt 150 lb 11.2 oz (68.4 kg)   SpO2 98%   BMI 21.62 kg/m²    Physical Exam   Constitutional: He appears well-developed and well-nourished. No distress. HENT:   Head: Normocephalic and atraumatic. Mouth/Throat: Oropharynx is clear and moist. No oropharyngeal exudate. Eyes: Pupils are equal, round, and reactive to light. EOM are normal.   Neck: Neck supple. No JVD present. Cardiovascular: Normal heart sounds. Exam reveals no gallop and no friction rub. No murmur heard. Pulmonary/Chest: Effort normal. He has wheezes. He has no rales. Equal chest rise and expansion bilaterally   Abdominal: Soft.  Bowel sounds are normal. He exhibits no distension. There is no tenderness. Musculoskeletal: Normal range of motion. He exhibits no edema. Lymphadenopathy:     He has no cervical adenopathy. Neurological: He is alert. No cranial nerve deficit. CN 2-12 grossly intact   Skin: Skin is warm and dry. No rash noted. He is not diaphoretic. Data Reviewed:   LABS:  CBC:No results for input(s): WBC, HGB, HCT, MCV, PLT in the last 72 hours. BMP:  Recent Labs     05/02/19  0716 05/03/19  0702 05/04/19  0701    138 139   K 3.4* 3.4* 4.1   CL 96* 95* 98*   CO2 35* 35* 33*   BUN 19 18 15   CREATININE 0.7* 0.6* <0.5*     LIVER PROFILE: No results for input(s): AST, ALT, LIPASE, ALB, BILIDIR, BILITOT, ALKPHOS in the last 72 hours. Invalid input(s): AMYLASE  PT/INR:No results for input(s): PROTIME, INR in the last 72 hours. APTT: No results for input(s): APTT in the last 72 hours. UA:No results for input(s): NITRITE, COLORU, PHUR, LABCAST, WBCUA, RBCUA, MUCUS, TRICHOMONAS, YEAST, BACTERIA, CLARITYU, SPECGRAV, LEUKOCYTESUR, UROBILINOGEN, BILIRUBINUR, BLOODU, GLUCOSEU, AMORPHOUS in the last 72 hours. Invalid input(s): KETONESU  No results for input(s): PHART, [id number], PO2ART in the last 72 hours. CXR personally reviewed, pulmonary edema and hyperinflation              Assessment:     1. Acute on chronic resp failure, hypoxic              -acute pulm edema              -probable underlying chronic bronchitis  2. Acute dCHF  3.  Moderate AS    Plan:      -Continue bronchodilators, his wheezing is likely cardiac from bronchial edema  -On diuresis, cardiology following  -Wean FIO2 as tolerated, continue post discharge  -Can stop pulm toileting measures including metaneb  -Holding off on further interventions/workup given his advanced age    DC planning per primary service, please contact with questions     Yeny Rey MD

## 2019-05-04 NOTE — PROGRESS NOTES
Pt d/c'd to [facility]. Removed R FA IV and stopped bleeding. Catheter intact. Pt tolerated well. No redness noted at site. Notified [facility] and removed tele box. Reviewed d/c instructions, home meds, and  f/u information utilizing teach-back method. Scripts for meds given to patient. Patient verbalized understanding.

## 2019-05-08 NOTE — ED PROVIDER NOTES
CHIEF COMPLAINT  Shortness of Breath (from assisted living, states SOB began 1-2hrs PTA)      HISTORY OF PRESENT ILLNESS  Alycia Dooley is a 80 y.o. male who presents to the ED complaining of Shortness of breath it's been going on for the past couple of hours. He Lives in an assisted living home he has a History of diabetes hypertension pulmonary embolism. He is denying any chest pain or any pain on breathing. Denies any pain in his legs. No other complaints, modifying factors or associated symptoms. I have reviewed the following from the nursing documentation. Past Medical History:   Diagnosis Date    Depression     Diabetes mellitus ([facility] [address]     Hyperlipidemia     Hypertension     Osteoporosis     PE (pulmonary thromboembolism) ([facility] [address]     Urinary retention      History reviewed. No pertinent surgical history. History reviewed. No pertinent family history. Social History     Socioeconomic History    Marital status:       Spouse name: Not on file    Number of children: Not on file    Years of education: Not on file    Highest education level: Not on file   Occupational History    Not on file   Social Needs    Financial resource strain: Not on file    Food insecurity:     Worry: Not on file     Inability: Not on file    Transportation needs:     Medical: Not on file     Non-medical: Not on file   Tobacco Use    Smoking status: Never Smoker    Smokeless tobacco: Never Used   Substance and Sexual Activity    Alcohol use: Not on file    Drug use: Not on file    Sexual activity: Not on file   Lifestyle    Physical activity:     Days per week: Not on file     Minutes per session: Not on file    Stress: Not on file   Relationships    Social connections:     Talks on phone: Not on file     Gets together: Not on file     Attends [employer] service: Not on file     Active member of club or organization: Not on file     Attends meetings of clubs or organizations: Not on file Relationship status: Not on file    Intimate partner violence:     Fear of current or ex partner: Not on file     Emotionally abused: Not on file     Physically abused: Not on file     Forced sexual activity: Not on file   Other Topics Concern    Not on file   Social History Narrative    Not on file     No current facility-administered medications for this encounter.       Current Outpatient Medications   Medication Sig Dispense Refill    guaiFENesin (MUCINEX) 600 MG extended release tablet Take 1 tablet by mouth 2 times daily 8 tablet 0    benzonatate (TESSALON) 100 MG capsule Take 1 capsule by mouth 3 times daily as needed for Cough 10 capsule 0    lidocaine 4 % external patch Place 1 patch onto the skin daily 5 patch 0    senna (SENOKOT) 8.6 MG tablet Take 1 tablet by mouth daily as needed (Constipation)      spironolactone (ALDACTONE) 25 MG tablet Take 0.5 tablets by mouth daily 30 tablet 3    furosemide (LASIX) 40 MG tablet 3 days weekly plus an additional 40 mg as needed for swelling 30 tablet 3    Cetirizine HCl 10 MG CAPS Take by mouth      vitamin B-12 (CYANOCOBALAMIN) 1000 MCG tablet Take 2,000 mcg by mouth daily      sertraline (ZOLOFT) 50 MG tablet Take 50 mg by mouth daily      docusate sodium (COLACE) 100 MG capsule Take 100 mg by mouth 2 times daily      ipratropium-albuterol (DUONEB) 0.5-2.5 (3) MG/3ML SOLN nebulizer solution Inhale 3 mLs into the lungs every 4 hours as needed for Shortness of Breath 360 mL 0    acetaminophen (TYLENOL) 325 MG tablet Take 650 mg by mouth nightly      alendronate (FOSAMAX) 70 MG tablet Take 70 mg by mouth every 7 days      atorvastatin (LIPITOR) 10 MG tablet Take 10 mg by mouth daily      calcium carbonate-vitamin D (CALCIUM 600 + D) 600-400 MG-UNIT TABS per tab Take 1 tablet by mouth 2 times daily      carvedilol (COREG) 3.125 MG tablet Take 3.125 mg by mouth 2 times daily (with meals)      finasteride (PROSCAR) 5 MG tablet Take 5 mg by mouth daily  glipiZIDE (GLUCOTROL XL) 5 MG extended release tablet Take 5 mg by mouth daily      lisinopril (PRINIVIL;ZESTRIL) 40 MG tablet Take 20 mg by mouth daily       metFORMIN (GLUCOPHAGE) 500 MG tablet Take 500 mg by mouth 2 times daily (with meals)      tamsulosin (FLOMAX) 0.4 MG capsule Take 0.4 mg by mouth daily       Allergies   Allergen Reactions    Celebrex [Celecoxib]     Penicillins        REVIEW OF SYSTEMS  10 systems reviewed, pertinent positives per HPI otherwise noted to be negative. PHYSICAL EXAM  BP (!) 174/74   Pulse 62   Temp 97.9 °F (36.6 °C) (Oral)   Resp 16   Ht 5' 10\" (1.778 m)   Wt 150 lb 11.2 oz (68.4 kg)   SpO2 92%   BMI 21.62 kg/m²   GENERAL APPEARANCE: Awake and alert. Cooperative. No acute distress. HEAD: Normocephalic. Atraumatic. EYES: PERRL. EOM's grossly intact. ENT: Mucous membranes are moist.   NECK: Supple. HEART: RRR. LUNGS: Some dyspnea with conversation, lungs sound crackly throughout. BACK: No midline spinal tenderness or step-off. ABDOMEN: Soft. Non-distended. Non-tender. No guarding or rebound. Normal bowel sounds. EXTREMITIES: No significant pitting edema, Moves all extremities equally. All extremities neurovascularly intact. SKIN: Warm and dry. No acute rashes. NEUROLOGICAL: Alert and oriented. No gross facial drooping. LABS  I have reviewed all labs for this visit.    Results for orders placed or performed during the hospital encounter of 04/30/19   CBC Auto Differential   Result Value Ref Range    WBC 11.2 (H) 4.0 - 11.0 K/uL    RBC 6.39 (H) 4.20 - 5.90 M/uL    Hemoglobin 16.0 13.5 - 17.5 g/dL    Hematocrit 50.0 40.5 - 52.5 %    MCV 78.3 (L) 80.0 - 100.0 fL    MCH 25.1 (L) 26.0 - 34.0 pg    MCHC 32.0 31.0 - 36.0 g/dL    RDW 18.1 (H) 12.4 - 15.4 %    Platelets 689 597 - 602 K/uL    MPV 7.4 5.0 - 10.5 fL    Neutrophils % 84.2 %    Lymphocytes % 9.4 %    Monocytes % 4.0 %    Eosinophils % 1.6 %    Basophils % 0.8 %    Neutrophils # 9.4 (H) 1.7 Range    Magnesium 1.50 (L) 1.80 - 2.40 mg/dL   Lipid panel - fasting   Result Value Ref Range    Cholesterol, Total 101 0 - 199 mg/dL    Triglycerides 93 0 - 150 mg/dL    HDL 42 40 - 60 mg/dL    LDL Calculated 40 <100 mg/dL    VLDL Cholesterol Calculated 19 Not Established mg/dL   Basic Metabolic Panel   Result Value Ref Range    Sodium 139 136 - 145 mmol/L    Potassium 3.4 (L) 3.5 - 5.1 mmol/L    Chloride 96 (L) 99 - 110 mmol/L    CO2 35 (H) 21 - 32 mmol/L    Anion Gap 8 3 - 16    Glucose 170 (H) 70 - 99 mg/dL    BUN 19 7 - 20 mg/dL    CREATININE 0.7 (L) 0.8 - 1.3 mg/dL    GFR Non-African American >60 >60    GFR African American >60 >60    Calcium 8.6 8.3 - 10.6 mg/dL   Magnesium   Result Value Ref Range    Magnesium 1.90 1.80 - 2.40 mg/dL   Brain Natriuretic Peptide   Result Value Ref Range    Pro-BNP 1,500 (H) 0 - 449 pg/mL   Basic Metabolic Panel   Result Value Ref Range    Sodium 138 136 - 145 mmol/L    Potassium 3.4 (L) 3.5 - 5.1 mmol/L    Chloride 95 (L) 99 - 110 mmol/L    CO2 35 (H) 21 - 32 mmol/L    Anion Gap 8 3 - 16    Glucose 166 (H) 70 - 99 mg/dL    BUN 18 7 - 20 mg/dL    CREATININE 0.6 (L) 0.8 - 1.3 mg/dL    GFR Non-African American >60 >60    GFR African American >60 >60    Calcium 8.9 8.3 - 10.6 mg/dL   Magnesium   Result Value Ref Range    Magnesium 1.70 (L) 1.80 - 2.40 mg/dL   Basic Metabolic Panel   Result Value Ref Range    Sodium 139 136 - 145 mmol/L    Potassium 4.1 3.5 - 5.1 mmol/L    Chloride 98 (L) 99 - 110 mmol/L    CO2 33 (H) 21 - 32 mmol/L    Anion Gap 8 3 - 16    Glucose 168 (H) 70 - 99 mg/dL    BUN 15 7 - 20 mg/dL    CREATININE <0.5 (L) 0.8 - 1.3 mg/dL    GFR Non-African American >60 >60    GFR African American >60 >60    Calcium 9.2 8.3 - 10.6 mg/dL   Magnesium   Result Value Ref Range    Magnesium 1.60 (L) 1.80 - 2.40 mg/dL   Brain Natriuretic Peptide   Result Value Ref Range    Pro-BNP 1,554 (H) 0 - 449 pg/mL   POCT Glucose   Result Value Ref Range    POC Glucose 131 (H) 70 - 99 mg/dl    Performed on ACCU-CHEK    POCT Glucose   Result Value Ref Range    POC Glucose 118 (H) 70 - 99 mg/dl    Performed on ACCU-CHEK    POCT Glucose   Result Value Ref Range    POC Glucose 180 (H) 70 - 99 mg/dl    Performed on ACCU-CHEK    POCT Glucose   Result Value Ref Range    POC Glucose 138 (H) 70 - 99 mg/dl    Performed on ACCU-CHEK    POCT Glucose   Result Value Ref Range    POC Glucose 187 (H) 70 - 99 mg/dl    Performed on ACCU-CHEK    POCT Glucose   Result Value Ref Range    POC Glucose 182 (H) 70 - 99 mg/dl    Performed on ACCU-CHEK    POCT Glucose   Result Value Ref Range    POC Glucose 210 (H) 70 - 99 mg/dl    Performed on ACCU-CHEK    POCT Glucose   Result Value Ref Range    POC Glucose 157 (H) 70 - 99 mg/dl    Performed on ACCU-CHEK    POCT Glucose   Result Value Ref Range    POC Glucose 182 (H) 70 - 99 mg/dl    Performed on ACCU-CHEK    POCT Glucose   Result Value Ref Range    POC Glucose 168 (H) 70 - 99 mg/dl    Performed on ACCU-CHEK    POCT Glucose   Result Value Ref Range    POC Glucose 191 (H) 70 - 99 mg/dl    Performed on ACCU-CHEK    POCT Glucose   Result Value Ref Range    POC Glucose 149 (H) 70 - 99 mg/dl    Performed on ACCU-CHEK    POCT Glucose   Result Value Ref Range    POC Glucose 155 (H) 70 - 99 mg/dl    Performed on ACCU-CHEK    POCT Glucose   Result Value Ref Range    POC Glucose 149 (H) 70 - 99 mg/dl    Performed on ACCU-CHEK    POCT Glucose   Result Value Ref Range    POC Glucose 191 (H) 70 - 99 mg/dl    Performed on ACCU-CHEK    POCT Glucose   Result Value Ref Range    POC Glucose 142 (H) 70 - 99 mg/dl    Performed on ACCU-CHEK    POCT Glucose   Result Value Ref Range    POC Glucose 258 (H) 70 - 99 mg/dl    Performed on ACCU-CHEK    POCT Glucose   Result Value Ref Range    POC Glucose 146 (H) 70 - 99 mg/dl    Performed on ACCU-CHEK    EKG 12 Lead   Result Value Ref Range    Ventricular Rate 85 BPM    Atrial Rate 85 BPM    P-R Interval 190 ms    QRS Duration 94 ms    Q-T Interval 368 ms    QTc Calculation (Bazett) 437 ms    P Axis 55 degrees    R Axis -53 degrees    T Axis 75 degrees    Diagnosis       Sinus rhythm with occasional Premature ventricular complexesLeft anterior fascicular blockLeft ventricular hypertrophy with repolarization abnormalityAbnormal ECGWhen compared with ECG of 31-OCT-2017 07:40,QRS duration has increasedST no longer depressed in Inferior leadsNon-specific change in ST segment in Lateral leadsConfirmed by [facility] GUZMAN, Bran Perez ([id number]) on 5/1/2019 5:40:44 PM       EKG  EKG interpreted by me. Sinus rhythm with PVCs, LVH, QRS 94, QTC 437No significant ST elevation or depression. RADIOLOGY  X-RAYS:  I have reviewed radiologic plain film image(s). ALL OTHER NON-PLAIN FILM IMAGES SUCH AS CT, ULTRASOUND AND MRI HAVE BEEN READ BY THE RADIOLOGIST. XR CHEST STANDARD (2 VW)   Final Result   Persistent pulmonary edema and small pleural effusions. Lung aeration   slightly improved from yesterday's exam.         XR CHEST PORTABLE   Final Result   Suspected underlying pulmonary edema given the vascular indistinctness                  ED COURSE/MDM  Patient seen and evaluated. Gave patient Lasix and will admit for new onset of congestive heart failure. Patient does not appear and to be in any significant distress but I am concerned considering his dyspnea on arrival as well as his long exam and his chest x-ray.     Discharge Medication List as of 5/4/2019  6:52 PM      START taking these medications    Details   guaiFENesin (MUCINEX) 600 MG extended release tablet Take 1 tablet by mouth 2 times daily, Disp-8 tablet, R-0NO PRINT      benzonatate (TESSALON) 100 MG capsule Take 1 capsule by mouth 3 times daily as needed for Cough, Disp-10 capsule, R-0NO PRINT      senna (SENOKOT) 8.6 MG tablet Take 1 tablet by mouth daily as needed (Constipation)OTC      spironolactone (ALDACTONE) 25 MG tablet Take 0.5 tablets by mouth daily, Disp-30 tablet, R-3Normal             CLINICAL IMPRESSION  1. New onset of congestive heart failure ([address]    2. Shortness of breath        Blood pressure (!) 174/74, pulse 62, temperature 97.9 °F (36.6 °C), temperature source Oral, resp. rate 16, height 5' 10\" (1.778 m), weight 150 lb 11.2 oz (68.4 kg), SpO2 92 %. DISPOSITION  Bola Tijerina was admitted in stable condition. This chart was generated in part by using Dragon Dictation system and may contain errors related to that system including errors in grammar, punctuation, and spelling, as well as words and phrases that may be inappropriate. When dictating, effort is made to correct spelling/grammar errors. If there are any questions or concerns please feel free to contact the dictating provider for clarification.      [facility], DO  EMERGENCY MEDICINE        Louisa Madison DO  05/08/19 [id number]

## 2019-05-13 NOTE — DISCHARGE SUMMARY
urinary retention, dc'd with [facility] and f/u as outpt              Physical Exam Performed:     BP (!) 174/74   Pulse 62   Temp 97.9 °F (36.6 °C) (Oral)   Resp 16   Ht 5' 10\" (1.778 m)   Wt 150 lb 11.2 oz (68.4 kg)   SpO2 92%   BMI 21.62 kg/m²     General appearance:  No apparent distress, appears stated age and cooperative. Frail appearing  HEENT:  Normal cephalic, atraumatic without obvious deformity. Pupils equal, round, and reactive to light.  Extra ocular muscles intact. Conjunctivae/corneas clear. Neck: Supple, with full range of motion. No jugular venous distention. Trachea midline. Respiratory:  Normal respiratory effort. Clear to auscultation, bilaterally without Rhonchi. bibas rales, mild scattered wheezing and ronchi  Cardiovascular:  Regular rate and rhythm with normal S1/S2 without murmurs, rubs or gallops. Abdomen: Soft, non-tender, non-distended with normal bowel sounds. Musculoskeletal:  No clubbing, cyanosis or edema bilaterally.  Full range of motion without deformity. Skin: Skin color, texture, turgor normal.  No rashes or lesions. Neurologic:  Neurovascularly intact without any focal sensory/motor deficits. Cranial nerves: II-XII intact, grossly non-focal.  Psychiatric:  Alert and oriented, thought content appropriate, normal insight  Capillary Refill: Brisk,< 3 seconds   Peripheral Pulses: +2 palpable, equal bilaterally           Labs:  For convenience and continuity at follow-up the following most recent labs are provided:      CBC:    Lab Results   Component Value Date    WBC 11.2 04/30/2019    HGB 16.0 04/30/2019    HCT 50.0 04/30/2019     04/30/2019       Renal:    Lab Results   Component Value Date     05/04/2019    K 4.1 05/04/2019    K 4.1 04/30/2019    CL 98 05/04/2019    CO2 33 05/04/2019    BUN 15 05/04/2019    CREATININE <0.5 05/04/2019    CALCIUM 9.2 05/04/2019    PHOS 3.0 11/07/2017         Significant Diagnostic Studies    Radiology:   XR CHEST STANDARD (2 VW)   Final Result   Persistent pulmonary edema and small pleural effusions.   Lung aeration   slightly improved from yesterday's exam.         XR CHEST PORTABLE   Final Result   Suspected underlying pulmonary edema given the vascular indistinctness                Consults:     IP CONSULT TO HOSPITALIST  IP CONSULT TO HEART FAILURE NURSE/COORDINATOR  IP CONSULT TO DIETITIAN  IP CONSULT TO CARDIOLOGY  IP CONSULT TO CARDIAC REHAB  IP CONSULT TO CARDIAC REHAB  IP CONSULT TO UROLOGY    Disposition:  SNF([facility])     Condition at Discharge: Stable    Discharge Instructions/Follow-up:  Continue aggressive pulm toilet, f/u cards as outpt    Code Status:  LIMITED    Activity: activity as tolerated    Diet: regular diet      Discharge Medications:     Discharge Medication List as of 5/4/2019  6:52 PM           Details   guaiFENesin (MUCINEX) 600 MG extended release tablet Take 1 tablet by mouth 2 times daily, Disp-8 tablet, R-0NO PRINT      benzonatate (TESSALON) 100 MG capsule Take 1 capsule by mouth 3 times daily as needed for Cough, Disp-10 capsule, R-0NO PRINT      senna (SENOKOT) 8.6 MG tablet Take 1 tablet by mouth daily as needed (Constipation)OTC      spironolactone (ALDACTONE) 25 MG tablet Take 0.5 tablets by mouth daily, Disp-30 tablet, R-3Normal              Details   lidocaine 4 % external patch Place 1 patch onto the skin daily, Transdermal, DAILY Starting Sun 5/5/2019, Disp-5 patch, R-0, Print      furosemide (LASIX) 40 MG tablet 3 days weekly plus an additional 40 mg as needed for swelling, Disp-30 tablet, R-3Normal              Details   Cetirizine HCl 10 MG CAPS Take by mouthHistorical Med      vitamin B-12 (CYANOCOBALAMIN) 1000 MCG tablet Take 2,000 mcg by mouth dailyHistorical Med      sertraline (ZOLOFT) 50 MG tablet Take 50 mg by mouth dailyHistorical Med      docusate sodium (COLACE) 100 MG capsule Take 100 mg by mouth 2 times dailyHistorical Med      ipratropium-albuterol (DUONEB) 0.5-2.5 (3) MG/3ML SOLN nebulizer solution Inhale 3 mLs into the lungs every 4 hours as needed for Shortness of Breath, Disp-360 mL, R-0Normal      acetaminophen (TYLENOL) 325 MG tablet Take 650 mg by mouth nightlyHistorical Med      alendronate (FOSAMAX) 70 MG tablet Take 70 mg by mouth every 7 daysHistorical Med      atorvastatin (LIPITOR) 10 MG tablet Take 10 mg by mouth dailyHistorical Med      calcium carbonate-vitamin D (CALCIUM 600 + D) 600-400 MG-UNIT TABS per tab Take 1 tablet by mouth 2 times dailyHistorical Med      carvedilol (COREG) 3.125 MG tablet Take 3.125 mg by mouth 2 times daily (with meals)Historical Med      finasteride (PROSCAR) 5 MG tablet Take 5 mg by mouth dailyHistorical Med      glipiZIDE (GLUCOTROL XL) 5 MG extended release tablet Take 5 mg by mouth dailyHistorical Med      lisinopril (PRINIVIL;ZESTRIL) 40 MG tablet Take 20 mg by mouth daily Historical Med      metFORMIN (GLUCOPHAGE) 500 MG tablet Take 500 mg by mouth 2 times daily (with meals)Historical Med      tamsulosin (FLOMAX) 0.4 MG capsule Take 0.4 mg by mouth dailyHistorical Med             Time Spent on discharge is more than 30 minutes in the examination, evaluation, counseling and review of medications and discharge plan. Signed:    Adam Younger MD   5/13/2019      Thank you Diamond Mayes MD for the opportunity to be involved in this patient's care. If you have any questions or concerns please feel free to contact me at [phone].

## 2019-05-23 NOTE — PROGRESS NOTES
[facility]   Cardiology Note              Date:  May 23, 2019  Patientname: Bola Tijerina  YOB: 1925    Primary Care physician: Philipp Miller MD    HISTORY OF PRESENT ILLNESS: Bola Tijerina is a 80 y.o. male with a PMH of HTN, HLD, DM, and hematuria. He was following with urology for hematuria/chronic UTI's and had a cystoscopy on 4/29 and was encouraged to increase fluids. 4/30/2019 he presented to the hospital with SOB. He was given IV fluids during his cystoscopy that possiblly contributed to fluid overload, he was admitted with acute dCHF. He was discharged on 5/4/2019 and went to [facility] [place] for skilled rehab. Today he presents with POA for hospital follow up for acute on chronic dCHF, HTN, SOB, and AS. When he left the hospital he went to the [facility] [place] for a short period of time and now is back living in a skilled nursing home. His POA stated they recenlty got a scale and it currently isn't working. He stated his swelling in BLE is better. He is taking his lasix M,W,F and has not had to take an extra dose for increased swelling or shortness of breath since he was discharged. He has eliminated extra salt in his diet and follows a 64 ounce fluid restriction. He was able to wean himself off his oxygen and his sats have been mid 95-97%. He denies any cough like he had when he was in the hospital. He doesn't monitor b/p at home, but has been monitroing his bs which has been running . He has been walking everyday with walker. Denies any chest pain, shortness of breath, dizziness, or palpitations. He has a chronic ortez at this time. Bola Tijerina describes symptoms including fatigue, edema but denies chest pain, dyspnea, palpitations, orthopnea, PND, exertional chest pressure/discomfort, early saiety, syncope.      Past Medical History:   has a past medical history of Depression, Diabetes mellitus ([facility] [address] Hyperlipidemia, Hypertension, Osteoporosis, PE (pulmonary thromboembolism) ([facility] [address] and Urinary retention. Past Surgical History:   has no past surgical history on file. Home Medications:    Prior to Admission medications    Medication Sig Start Date End Date Taking?  Authorizing Provider   guaiFENesin (MUCINEX) 600 MG extended release tablet Take 1 tablet by mouth 2 times daily 5/4/19   Edythe Habermann, MD   benzonatate (TESSALON) 100 MG capsule Take 1 capsule by mouth 3 times daily as needed for Cough 5/4/19   Edythe Habermann, MD   lidocaine 4 % external patch Place 1 patch onto the skin daily 5/5/19   Edythe Habermann, MD   senna (SENOKOT) 8.6 MG tablet Take 1 tablet by mouth daily as needed (Constipation) 5/4/19   Edythe Habermann, MD   spironolactone (ALDACTONE) 25 MG tablet Take 0.5 tablets by mouth daily 5/4/19   ART Colby CNP   furosemide (LASIX) 40 MG tablet 3 days weekly plus an additional 40 mg as needed for swelling 5/3/19   ART Colby CNP   Cetirizine HCl 10 MG CAPS Take by mouth    Historical Provider, MD   vitamin B-12 (CYANOCOBALAMIN) 1000 MCG tablet Take 2,000 mcg by mouth daily    Historical Provider, MD   sertraline (ZOLOFT) 50 MG tablet Take 50 mg by mouth daily    Historical Provider, MD   docusate sodium (COLACE) 100 MG capsule Take 100 mg by mouth 2 times daily    Historical Provider, MD   ipratropium-albuterol (DUONEB) 0.5-2.5 (3) MG/3ML SOLN nebulizer solution Inhale 3 mLs into the lungs every 4 hours as needed for Shortness of Breath 11/7/17   Annie Wilburn MD   acetaminophen (TYLENOL) 325 MG tablet Take 650 mg by mouth nightly    Historical Provider, MD   alendronate (FOSAMAX) 70 MG tablet Take 70 mg by mouth every 7 days    Historical Provider, MD   atorvastatin (LIPITOR) 10 MG tablet Take 10 mg by mouth daily    Historical Provider, MD   calcium carbonate-vitamin D (CALCIUM 600 + D) 600-400 MG-UNIT TABS per tab Take 1 tablet by mouth 2 times daily    Historical Provider, MD   carvedilol (COREG) 3.125 MG tablet Take 3.125 mg by mouth 2 times daily (with meals)    Historical Provider, MD   finasteride (PROSCAR) 5 MG tablet Take 5 mg by mouth daily    Historical Provider, MD   glipiZIDE (GLUCOTROL XL) 5 MG extended release tablet Take 5 mg by mouth daily    Historical Provider, MD   lisinopril (PRINIVIL;ZESTRIL) 40 MG tablet Take 20 mg by mouth daily     Historical Provider, MD   metFORMIN (GLUCOPHAGE) 500 MG tablet Take 500 mg by mouth 2 times daily (with meals)    Historical Provider, MD   tamsulosin (FLOMAX) 0.4 MG capsule Take 0.4 mg by mouth daily    Historical Provider, MD       Allergies:  Celebrex [celecoxib] and Penicillins    Social History:   reports that he has never smoked. He has never used smokeless tobacco. He reports that he drank alcohol. He reports that he does not use drugs. Family History: family history is not on file. Review of Systems   Review of Systems   Constitutional: Positive for fatigue. Negative for activity change. Respiratory: Negative for cough, chest tightness and shortness of breath. Musculoskeletal: Positive for gait problem (uses walker).      OBJECTIVE:    Vital signs:    /72   Pulse 73   Ht 5' 10\" (1.778 m)   Wt 157 lb (71.2 kg)   SpO2 95%   BMI 22.53 kg/m²      Physical Exam:  Constitutional: Comfortable and alert, NAD, appears stated age  Eyes: PERRL, sclera nonicteric  Neck:  Supple, no masses, no thyroidmegaly, no JVD  Skin:  Warm and dry; no rash or lesions  Heart: Regular, normal apex, S1 and S2 normal, no M/G/R  Lungs:  Normal respiratory effort; clear; no wheezing/rhonchi/rales  Abdomen: soft, non tender, + bowel sounds  Extremities: BLE discolored and dry, with trace edema, no cyanosis; no clubbing  Neuro: alert and oriented, moves legs and arms equally, normal mood and affect, slow steady gait with walker    Data Reviewed:    EKG 4/30/2019:  Sinus rhythm with occasional Premature ventricular complexesLeft anterior fascicular blockLeft PLT 88 11/03/2017     BMP:  Lab Results   Component Value Date     05/04/2019     05/03/2019     05/02/2019    K 4.1 05/04/2019    K 3.4 05/03/2019    K 3.4 05/02/2019    K 4.1 04/30/2019    CL 98 05/04/2019    CL 95 05/03/2019    CL 96 05/02/2019    CO2 33 05/04/2019    CO2 35 05/03/2019    CO2 35 05/02/2019    PHOS 3.0 11/07/2017    PHOS 2.9 11/06/2017    PHOS 3.1 11/05/2017    BUN 15 05/04/2019    BUN 18 05/03/2019    BUN 19 05/02/2019    CREATININE <0.5 05/04/2019    CREATININE 0.6 05/03/2019    CREATININE 0.7 05/02/2019     BNP:   Lab Results   Component Value Date    PROBNP 1,554 05/04/2019    PROBNP 1,500 05/02/2019    PROBNP 3,026 04/30/2019     FASTING LIPID PANEL:  Lab Results   Component Value Date    HDL 42 05/01/2019    LDLCALC 40 05/01/2019    TRIG 93 05/01/2019     LIVER PROFILE:No results for input(s): AST, ALT, ALB in the last 72 hours. Reviewed all labs and imaging today    Assessment:   1. Acute on Chronic diastolic heart failure: compensated   2. HTN: controlled  3. SOB: resolved  4. HLD: labs reviewed, LDL 40 on statin   5. Moderate AS    Plan:   1. Continue all heart medicine  2. Monitor blood pressure at home every day and keep a log, bring with you to next appointment   3. BMP, BNP today to evaluate kidney function and potassium level since starting aldactone and Lasix  4. Daily weight, low sodium diet, 64 ounces of fluids a day  5. If you gain 3 lbs in a day, 5 lbs in a week, increased swelling in legs, or increased shortness of breath ok to take an extra dose of lasix. If you have to take more than 2 doses in a week, please call the office. 6.Follow up with me in 3 months    ART Andrade-CNP  [address]  [phone]       QUALITY MEASURES  1. Tobacco Cessation Counseling: NA  2. Retake of BP if >140/90:   NA  3. Documentation to PCP/referring for new patient:  Sent to PCP at close of office visit  4. CAD patient on anti-platelet: NA  5.  CAD patient on STATIN therapy:  NA  6.  Patient with CHF and aFib on anticoagulation:  NA

## 2019-05-23 NOTE — PATIENT INSTRUCTIONS
[facility]   Cardiology Note              Date:  May 23, 2019  Patientname: Wilfrido Cole  YOB: 1925    Primary Care physician: Claude Tran MD    HISTORY OF PRESENT ILLNESS: Wilfrido Cole is a 80 y.o. male with a PMH of HTN, HLD, DM, and hematuria. He was following with urology for hematuria/chronic UTI's and had a cystoscopy on 4/29 and was encouraged to increase fluids. 4/30/2019 he presented to the hospital with SOB. He was given IV fluids during his cystoscopy that possiblly contributed to fluid overload, he was admitted with acute dCHF. He was discharged on 5/4/2019 and went to [facility] for skilled rehab. Today he presents with POA for hospital follow up for acute on chronic dCHF, HTN, SOB, and AS. When he left the hospital he went to the [facility] for a short period of time and now is back living in a intermediate home. His POA stated they recenlty got a scale and it currently isn't working. He stated his swelling in BLE is better. He is taking his lasix M,W,F and has not had to take an extra dose for increased swelling or shortness of breath since he was discharged. He has eliminated extra salt in his diet and follows a 64 ounce fluid restriction. He was able to wean himself off his oxygen and his sats have been mid 95-97%. He denies any cough like he had when he was in the hospital. He doesn't monitor b/p at home, but has been monitroing his bs which has been running . He has been walking everyday with walker. Denies any chest pain, shortness of breath, dizziness, or palpitations. He has a chronic ortez at this time. Wilfrido Cole describes symptoms including fatigue, edema but denies chest pain, dyspnea, palpitations, orthopnea, PND, exertional chest pressure/discomfort, early saiety, syncope.      Past Medical History:   has a past medical history of Depression, Diabetes mellitus ([address] Hyperlipidemia, Hypertension, Osteoporosis, PE (pulmonary thromboembolism) ([facility] [address] and Urinary retention. Past Surgical History:   has no past surgical history on file. Home Medications:    Prior to Admission medications    Medication Sig Start Date End Date Taking?  Authorizing Provider   guaiFENesin (MUCINEX) 600 MG extended release tablet Take 1 tablet by mouth 2 times daily 5/4/19   Carolynn Brown MD   benzonatate (TESSALON) 100 MG capsule Take 1 capsule by mouth 3 times daily as needed for Cough 5/4/19   Carolynn Brown MD   lidocaine 4 % external patch Place 1 patch onto the skin daily 5/5/19   Carolynn Brown MD   senna (SENOKOT) 8.6 MG tablet Take 1 tablet by mouth daily as needed (Constipation) 5/4/19   Carolynn Brown MD   spironolactone (ALDACTONE) 25 MG tablet Take 0.5 tablets by mouth daily 5/4/19   Chris ART Heart - CNP   furosemide (LASIX) 40 MG tablet 3 days weekly plus an additional 40 mg as needed for swelling 5/3/19   ART Flower - CNP   Cetirizine HCl 10 MG CAPS Take by mouth    Historical Provider, MD   vitamin B-12 (CYANOCOBALAMIN) 1000 MCG tablet Take 2,000 mcg by mouth daily    Historical Provider, MD   sertraline (ZOLOFT) 50 MG tablet Take 50 mg by mouth daily    Historical Provider, MD   docusate sodium (COLACE) 100 MG capsule Take 100 mg by mouth 2 times daily    Historical Provider, MD   ipratropium-albuterol (DUONEB) 0.5-2.5 (3) MG/3ML SOLN nebulizer solution Inhale 3 mLs into the lungs every 4 hours as needed for Shortness of Breath 11/7/17   Domenica Blizzard, MD   acetaminophen (TYLENOL) 325 MG tablet Take 650 mg by mouth nightly    Historical Provider, MD   alendronate (FOSAMAX) 70 MG tablet Take 70 mg by mouth every 7 days    Historical Provider, MD   atorvastatin (LIPITOR) 10 MG tablet Take 10 mg by mouth daily    Historical Provider, MD   calcium carbonate-vitamin D (CALCIUM 600 + D) 600-400 MG-UNIT TABS per tab Take 1 tablet by mouth 2 times daily    Historical Provider, MD   carvedilol (COREG) 3.125 MG tablet Take 3.125 mg by mouth 2 times daily (with meals)    Historical Provider, MD   finasteride (PROSCAR) 5 MG tablet Take 5 mg by mouth daily    Historical Provider, MD   glipiZIDE (GLUCOTROL XL) 5 MG extended release tablet Take 5 mg by mouth daily    Historical Provider, MD   lisinopril (PRINIVIL;ZESTRIL) 40 MG tablet Take 20 mg by mouth daily     Historical Provider, MD   metFORMIN (GLUCOPHAGE) 500 MG tablet Take 500 mg by mouth 2 times daily (with meals)    Historical Provider, MD   tamsulosin (FLOMAX) 0.4 MG capsule Take 0.4 mg by mouth daily    Historical Provider, MD       Allergies:  Celebrex [celecoxib] and Penicillins    Social History:   reports that he has never smoked. He has never used smokeless tobacco. He reports that he drank alcohol. He reports that he does not use drugs. Family History: family history is not on file. Review of Systems   Review of Systems   Constitutional: Positive for fatigue. Negative for activity change. Respiratory: Negative for cough, chest tightness and shortness of breath. Musculoskeletal: Positive for gait problem (uses walker).      OBJECTIVE:    Vital signs:    /72   Pulse 73   Ht 5' 10\" (1.778 m)   Wt 157 lb (71.2 kg)   SpO2 95%   BMI 22.53 kg/m²       Physical Exam:  Constitutional: Comfortable and alert, NAD, appears stated age  Eyes: PERRL, sclera nonicteric  Neck:  Supple, no masses, no thyroidmegaly, no JVD  Skin:  Warm and dry; no rash or lesions  Heart: Regular, normal apex, S1 and S2 normal, no M/G/R  Lungs:  Normal respiratory effort; clear; no wheezing/rhonchi/rales  Abdomen: soft, non tender, + bowel sounds  Extremities: BLE discolored and dry, with trace edema, no cyanosis; no clubbing  Neuro: alert and oriented, moves legs and arms equally, normal mood and affect, slow steady gait with walker    Data Reviewed:    EKG 4/30/2019:  Sinus rhythm with occasional Premature ventricular complexesLeft anterior fascicular blockLeft ventricular hypertrophy with repolarization abnormalityAbnormal ECGWhen compared with ECG of 31-OCT-2017 07:40,QRS duration has increasedST no longer depressed in Inferior leadsNon-specific change in ST segment in Lateral leadsConfirmed by [facility] GUZMAN, Bran Perez ([id number]) on 5/1/2019 5:40:44 PM     Echo 4/30/2019:  Technically difficult examination due to patient immobility. No IV access  was available for contrast.  Normal left ventricular systolic function with a visually estimated ejection  fraction of 55%. No regional wall motion abnormalities are seen. Grade I diastolic dysfunction with normal LV filling pressures. The right ventricle is not well visualized but appears enlarged  Mild right atrial enlargement. Compared to the last echo on 11/01/2017 (2.45 m/sec), aortic valve  velocities appear to have decreased. Findings are consistent with moderate AS. Frequent ventricular ectopy during exam.    Echo 11/1/2017:   Left ventricular systolic function is normal with the ejection fraction  estimated at 55%. No regional wall motion abnormalities. Grade II diastolic dysfunction with elevated filling pressure. Moderately dilated left atrium. Right ventricle is mildly enlarged. Right atrium is mildly dilated. Mild aortic stenosis. Systolic pulmonary artery pressure (SPAP) is normal and estimated at 39 mmHg  (RA pressure 8 mmHg).      Cardiology Labs Reviewed:   Lab Data:  Most recent lab results below reviewed in office    CBC:   Lab Results   Component Value Date    WBC 11.2 04/30/2019    WBC 8.2 11/05/2017    WBC 20.5 11/03/2017    RBC 6.39 04/30/2019    RBC 3.50 11/05/2017    RBC 3.37 11/03/2017    HGB 16.0 04/30/2019    HGB 10.7 11/05/2017    HGB 10.3 11/03/2017    HCT 50.0 04/30/2019    HCT 32.4 11/05/2017    HCT 31.4 11/03/2017    MCV 78.3 04/30/2019    MCV 92.6 11/05/2017    MCV 93.1 11/03/2017    RDW 18.1 04/30/2019    RDW 15.2 11/05/2017    RDW 15.4 11/03/2017     04/30/2019     11/05/2017 PLT 88 11/03/2017     BMP:  Lab Results   Component Value Date     05/04/2019     05/03/2019     05/02/2019    K 4.1 05/04/2019    K 3.4 05/03/2019    K 3.4 05/02/2019    K 4.1 04/30/2019    CL 98 05/04/2019    CL 95 05/03/2019    CL 96 05/02/2019    CO2 33 05/04/2019    CO2 35 05/03/2019    CO2 35 05/02/2019    PHOS 3.0 11/07/2017    PHOS 2.9 11/06/2017    PHOS 3.1 11/05/2017    BUN 15 05/04/2019    BUN 18 05/03/2019    BUN 19 05/02/2019    CREATININE <0.5 05/04/2019    CREATININE 0.6 05/03/2019    CREATININE 0.7 05/02/2019     BNP:   Lab Results   Component Value Date    PROBNP 1,554 05/04/2019    PROBNP 1,500 05/02/2019    PROBNP 3,026 04/30/2019     FASTING LIPID PANEL:  Lab Results   Component Value Date    HDL 42 05/01/2019    LDLCALC 40 05/01/2019    TRIG 93 05/01/2019     LIVER PROFILE:No results for input(s): AST, ALT, ALB in the last 72 hours. Reviewed all labs and imaging today    Assessment:   1. Acute on Chronic diastolic heart failure: compensated   2. HTN: controlled  3. SOB: resolved  4. HLD: labs reviewed, LDL 40 on statin   5. Moderate AS    Plan:   1. Continue all heart medicine  2. Monitor blood pressure at home every day and keep a log, bring with you to next appointment   3. BMP, BNP today to evaluate kidney function and potassium level since starting aldactone and Lasix  4. Daily weight, low sodium diet, 64 ounces of fluids a day  5. If you gain 3 lbs in a day, 5 lbs in a week, increased swelling in legs, or increased shortness of breath ok to take an extra dose of lasix. If you have to take more than 2 doses in a week, please call the office. 6.Follow up with me in 3 months    [address]  [phone]       QUALITY MEASURES  1. Tobacco Cessation Counseling: NA  2. Retake of BP if >140/90:   NA  3. Documentation to PCP/referring for new patient:  Sent to PCP at close of office visit  4. CAD patient on anti-platelet: NA  5.  CAD patient on

## 2019-05-24 NOTE — TELEPHONE ENCOUNTER
----- Message from ART Andrade CNP sent at 5/24/2019  8:15 AM EDT -----  His \"heart failure\" number has improved, but his potassium level is high. This is most likely due to starting the aldactone. Stop taking the aldactone and repeat BMP in one week (I placed the order). See me as scheduled in 1 month.

## 2019-05-24 NOTE — TELEPHONE ENCOUNTER
Spoke with Estrella Cintron, he would like us to contact his daughter Shea Owens [phone]. Spoke with Shea Owens, relayed message per BRANDON Manriquez. Isabel verbalized understanding.

## 2019-08-25 NOTE — ED PROVIDER NOTES
[facility]  EMERGENCY DEPT VISIT      Patient Identification  Kelli Angel is a 80 y.o. male. Chief Complaint   Abdominal Pain (woke from sleep feeling dizziness with right sided abdominal pain, c/o lightheadedness) and Dizziness      History of Present Illness: This is a  80 y.o. male who presents via ambulance to the ED with complaints of lightheadedness and dizziness. Patient states that he rolled over at 2:00 in the morning and became lightheaded and dizzy. He did not try to walk. He typically gets around with a walker. He experienced some discomfort in his right rib cage area. He denied palpitations or shortness of breath. The right sided chest discomfort has essentially gone away. Patient denies diaphoresis. No fever. No abdominal pain. No nausea or vomiting or diarrhea. He does have a problem with chronic recurrent UTIs but denies dysuria or hematuria at this time. Currently he states that he is feeling somewhat better. There have been no recent medication changes. Past Medical History:   Diagnosis Date    Depression     Diabetes mellitus ([facility]     Hyperlipidemia     Hypertension     Osteoporosis     [facility]     Urinary retention        History reviewed. No pertinent surgical history. No current facility-administered medications for this encounter.      Current Outpatient Medications:     cefUROXime (CEFTIN) 250 MG tablet, Take 1 tablet by mouth 2 times daily for 10 days, Disp: 20 tablet, Rfl: 0    meclizine (ANTIVERT) 25 MG tablet, Take 1 tablet by mouth 3 times daily as needed for Dizziness, Disp: 15 tablet, Rfl: 0    guaiFENesin (MUCINEX) 600 MG extended release tablet, Take 1 tablet by mouth 2 times daily, Disp: 8 tablet, Rfl: 0    benzonatate (TESSALON) 100 MG capsule, Take 1 capsule by mouth 3 times daily as needed for Cough, Disp: 10 capsule, Rfl: 0    senna (SENOKOT) 8.6 MG tablet, Take 1 tablet by mouth daily as needed (Constipation), Disp: , Rfl:     furosemide (LASIX) 40 MG tablet, 3 days weekly plus an additional 40 mg as needed for swelling, Disp: 30 tablet, Rfl: 3    Cetirizine HCl 10 MG CAPS, Take by mouth, Disp: , Rfl:     vitamin B-12 (CYANOCOBALAMIN) 1000 MCG tablet, Take 2,000 mcg by mouth daily, Disp: , Rfl:     sertraline (ZOLOFT) 50 MG tablet, Take 50 mg by mouth daily, Disp: , Rfl:     docusate sodium (COLACE) 100 MG capsule, Take 100 mg by mouth daily , Disp: , Rfl:     alendronate (FOSAMAX) 70 MG tablet, Take 70 mg by mouth every 7 days, Disp: , Rfl:     atorvastatin (LIPITOR) 10 MG tablet, Take 10 mg by mouth daily, Disp: , Rfl:     calcium carbonate-vitamin D (CALCIUM 600 + D) 600-400 MG-UNIT TABS per tab, Take 1 tablet by mouth 2 times daily, Disp: , Rfl:     carvedilol (COREG) 3.125 MG tablet, Take 3.125 mg by mouth 2 times daily (with meals), Disp: , Rfl:     glipiZIDE (GLUCOTROL XL) 5 MG extended release tablet, Take 5 mg by mouth daily, Disp: , Rfl:     lisinopril (PRINIVIL;ZESTRIL) 40 MG tablet, Take 20 mg by mouth daily , Disp: , Rfl:     metFORMIN (GLUCOPHAGE) 500 MG tablet, Take 500 mg by mouth 2 times daily (with meals), Disp: , Rfl:     tamsulosin (FLOMAX) 0.4 MG capsule, Take 0.4 mg by mouth daily, Disp: , Rfl:     lidocaine 4 % external patch, Place 1 patch onto the skin daily (Patient taking differently: Place 1 patch onto the skin daily as needed ), Disp: 5 patch, Rfl: 0    ipratropium-albuterol (DUONEB) 0.5-2.5 (3) MG/3ML SOLN nebulizer solution, Inhale 3 mLs into the lungs every 4 hours as needed for Shortness of Breath, Disp: 360 mL, Rfl: 0    acetaminophen (TYLENOL) 325 MG tablet, Take 650 mg by mouth daily as needed , Disp: , Rfl:     Allergies   Allergen Reactions    Celebrex [Celecoxib]     Penicillins        Social History     Socioeconomic History    Marital status:       Spouse name: Not on file    Number of children: Not on file    Years of education: Not on file    Highest Awake and alert. VITAL SIGNS:   ED Triage Vitals [08/25/19 0414]   Enc Vitals Group      BP (!) 195/77      Pulse 84      Resp 17      Temp 97.8 °F (36.6 °C)      Temp Source Oral      SpO2 92 %      Weight 160 lb (72.6 kg)      Height 5' 10\" (1.778 m)      Head Circumference       Peak Flow       Pain Score       Pain Loc       Pain Edu? Excl. in [address]? HEAD: Normocephalic, atraumatic. EYES:  Extraocular muscles are intact. Pupils equal round and reactive to light. Conjunctivas are pink. Negative scleral icterus. ENT:  Mucous membranes are moist.  Pharynx without erythema or exudates. NECK: Nontender and supple. No cervical adenopathy. CHEST:  Clear to auscultation bilaterally. No rales, rhonchi, or wheezing. HEART:  Regular rate and regular rhythm. No murmurs. Strong and equal pulses in the upper and lower extremities. ABDOMEN: Soft,  nondistended, positive bowel sounds. abdomen is nontender. No rebound. no guarding. MUSCULOSKELETAL: The calves are nontender to palpation. Active range of motion of the upper and lower extremities. No edema. NEUROLOGICAL: Awake, alert and oriented x 3. Power intact in the upper and lower extremities. Sensation is intact to light touch in the upper and lower extremities. Cranial Nerves 2-12 are intact. No truncal ataxia. No dysarthria or aphasia. Normal finger to nose. DERMATOLOGIC: No petechiae, rashes, or ecchymoses. No erythema. PSYCH: normal mood and affect. Normal thought content. ED COURSE AND MEDICAL DECISION MAKING:    EKG as interpreted by myself:  normal sinus rhythm with a rate of 72  Axis is   Left axis deviation  QTc is  normal  LAFB  No specific ST-T wave changes appreciated. No evidence of acute ischemia. Compared to prior EKG dated 4/30/19, PVCs are no longer present    Radiology:  Films have been read by radiologist as noted in chart unless otherwise stated.  Other radiologic studies (i.e. CT, MRI, ultrasounds, etc ) have been

## 2019-08-25 NOTE — ED NOTES
MD Matthews in room to talk with patient and POA about plan of care. No concerns at this time.       Judge Diana RN  08/25/19 9071

## 2019-08-25 NOTE — ED NOTES
Rocephin was started at 50ml/hr per request of MD Matthews. If pt tolerates well with increase speed later.       Brianna Barraza RN  08/25/19 9163

## 2019-09-18 PROBLEM — N30.01 ACUTE CYSTITIS WITH HEMATURIA: Status: ACTIVE | Noted: 2019-01-01

## 2019-09-23 PROBLEM — J81.0 ACUTE PULMONARY EDEMA (HCC): Status: ACTIVE | Noted: 2019-01-01

## 2019-09-23 PROBLEM — R09.89 CHEST CONGESTION: Status: ACTIVE | Noted: 2019-01-01

## 2019-09-23 PROBLEM — J98.11 ATELECTASIS: Status: ACTIVE | Noted: 2019-01-01

## 2019-09-23 PROBLEM — T17.908A ASPIRATION INTO AIRWAY: Status: ACTIVE | Noted: 2019-01-01

## 2019-09-27 LAB
BLOOD CULTURE, ROUTINE: NORMAL
CULTURE, BLOOD 2: NORMAL

## 2019-10-28 LAB
FUNGUS (MYCOLOGY) CULTURE: ABNORMAL
FUNGUS (MYCOLOGY) CULTURE: ABNORMAL
FUNGUS STAIN: ABNORMAL
ORGANISM: ABNORMAL
ORGANISM: ABNORMAL

## 2019-11-12 LAB
AFB CULTURE (MYCOBACTERIA): NORMAL
AFB SMEAR: NORMAL

## 2023-08-17 NOTE — LETTER
Beneficiary Notification Letter     This [facility] Provider is Participating in an Innovative Payment and [address] [place] [address] [place] from Medicare     Greetings:   [facility] is participating in a Medicare initiative called the [facility] for [address]. You are receiving this letter because your health care provider has identified you as a patient who is receiving care through this initiative. Health care providers participating in the [facility] [address], including [facility], will work with Medicare to improve care for patients. Your Medicare rights have not been changed. You still have all the same Medicare rights and protections, including the right to choose which hospital, doctor, or other health care provider you see. However, because [facility] chose to participate in the [address] [place] [address], all Medicare beneficiaries who meet the eligibility criteria of this initiative will receive care under the initiative. If you do not wish to receive care under the Bundled Payments [facility] [address], you must choose a health care provider that does not participate in this initiative for your care. Regardless of which health care provider you see, Medicare will continue to cover all of your medically necessary services. Bundled Payments for Care Improvement Advanced aims to help improve your care     The Bundled Payments [facility] [address] is an innovative Medicare initiative that encourages your doctors, hospitals, and other health care providers to work more closely together so you get better care during and following certain hospital stays.  In this initiative, doctors and hospitals may work closely with certain health care providers and suppliers that help patients recover after discharge from the hospital, including skilled nursing facilities, home health agencies, inpatient rehabilitation facilities, and long term care hospitals. [facility] is working closely with the doctors and other health care providers that care for you during and following your hospital stay and for a period of time after you leave the hospital. By working together, the health care providers are trying to more efficiently provide well-managed, high quality, patient-centered care as you undergo treatment. Hospitals, doctors, and other health care providers that care for you following a hospital stay may receive an additional payment for providing better, more coordinated health care. Medicare will monitor your care to make sure you and others get high quality care. Your feedback is important     Medicare may also ask you to answer a survey about the services and care you received from [address] will be mailed to you. Your feedback will improve care for all people with Medicare who receive care from [facility]. Completion of this survey is optional.     Get more information     For more information about the Bundled Payments for [address] [place] [address], you can:    · Visit the CMS BPCI Advanced Website at http://gomes-beavers.net/ initiatives/bpci-advanced   · Call the [facility] team at (064) 218-4622. · Call 1-800-MEDICARE (0-251.774.7056). TTY users can call 9-494.134.5242     If you have concerns or complaints about your care, talk to your health care provider, or contact your Beneficiary and Family Centered Quality Improvement Organization [facility]). To get your [facility]'s phone number, visit Medicare.gov/contacts or call 1-800-MEDICARE. · To find a different hospital, visit www. hospitalcompare.[facility].gov or call 1-800OKDJ.fm MEDICARE (1-166.156.9087). TTY users should call 0-276.848.4886. · To find a different doctor, visit Medicare's Physician Compare website, HDTapes.co.nz, or call 1-800-MEDICARE (897 4434). TTY users should call 3-757.507.5617. · To find a different skilled nursing facility, visit [facility] website, https://www.ADP/, or call 1-800-MEDICARE (1- 604.258.6616). TTY users should call 2-378.106.4125. · To find a different long term care hospital, visit [facility] [address] Compare website, GMIellology.be, or call 1-800- MEDICARE (610 2368). TTY users should call 9-726.166.9499. · To find a different inpatient rehabilitation facility, visit [facility] Compare website, www.medicare.gov/ inpatientrehabilitation facilitycompare, or call 1-800-MEDICARE (8-207.316.1036). TTY users should call 9- 562.492.6267. · To find a different home health agency, visit [address] website, www.medicare.gov/homehealthcompare, or call 1-800-MEDICARE (7-583- 463-1451). TTY users should call 1-415.812.6260. leg pain L

## 2024-11-24 NOTE — CARE COORDINATION
CM met with pt at bedside to discuss therapy recs. For snf. Pt has been to the [facility] in the past and would like referral faxed to this facility. Writer faxed referral, LVM with Jiim Peraza in regards to pts medicare days and cost. Will follow. ADDENDUM: writer spoke to [facility] and they can accept pt.  At d/c pt wanting to know medicare days left and cost. DISCHARGE

## (undated) DEVICE — TUBING, SUCTION, 3/16" X 6', STRAIGHT: Brand: MEDLINE

## (undated) DEVICE — TUBING, SUCTION, 3/16" X 12', STRAIGHT: Brand: MEDLINE

## (undated) DEVICE — SINGLE USE BIOPSY VALVE MAJ-210: Brand: SINGLE USE BIOPSY VALVE (STERILE)

## (undated) DEVICE — SYRINGE MED 10ML SLIP TIP BLNT FILL AND LUERLOCK DISP

## (undated) DEVICE — ENDO CARRY-ON PROCEDURE KIT INCLUDES SUCTION TUBING, LUBRICANT, GAUZE, BIOHAZARD STICKER, TRANSPORT PAD AND INTERCEPT BEDSIDE KIT.: Brand: ENDO CARRY-ON PROCEDURE KIT

## (undated) DEVICE — LINER,SOFT,SUCTION CANISTER,1500CC: Brand: MEDLINE

## (undated) DEVICE — FRAME EYEWR PROTCT ASST CLR DISP SAFEVIEW

## (undated) DEVICE — YANKAUER,BULB TIP,W/O VENT,RIGID,STERILE: Brand: MEDLINE

## (undated) DEVICE — Z DISCONTINUED USE 2276105 GOWN PROTCT UNIV CHST W28IN L49IN SL 24IN BLU SPUNBOND FLM

## (undated) DEVICE — SYRINGE MED 50ML LUERSLIP TIP

## (undated) DEVICE — CONMED SCOPE SAVER BITE BLOCK, 20X27 MM: Brand: SCOPE SAVER

## (undated) DEVICE — TRAP,MUCUS SPECIMEN, 80CC: Brand: MEDLINE

## (undated) DEVICE — SINGLE USE SUCTION VALVE MAJ-209: Brand: SINGLE USE SUCTION VALVE (STERILE)

## (undated) DEVICE — BOWL MED M 16OZ PLAS CAP GRAD

## (undated) DEVICE — ELECTRODE,RADIOTRANSLUCENT,FOAM,3PK: Brand: MEDLINE